# Patient Record
Sex: FEMALE | Race: WHITE | NOT HISPANIC OR LATINO | Employment: OTHER | URBAN - METROPOLITAN AREA
[De-identification: names, ages, dates, MRNs, and addresses within clinical notes are randomized per-mention and may not be internally consistent; named-entity substitution may affect disease eponyms.]

---

## 2020-01-04 ENCOUNTER — HOSPITAL ENCOUNTER (INPATIENT)
Facility: HOSPITAL | Age: 85
LOS: 5 days | Discharge: HOME HEALTH | DRG: 871 | End: 2020-01-09
Attending: EMERGENCY MEDICINE | Admitting: INTERNAL MEDICINE
Payer: MEDICARE

## 2020-01-04 DIAGNOSIS — J21.9 BRONCHIOLITIS: ICD-10-CM

## 2020-01-04 DIAGNOSIS — I48.20 CHRONIC ATRIAL FIBRILLATION: ICD-10-CM

## 2020-01-04 DIAGNOSIS — I48.91 NEW ONSET A-FIB: ICD-10-CM

## 2020-01-04 DIAGNOSIS — R05.9 COUGH: ICD-10-CM

## 2020-01-04 DIAGNOSIS — J21.9 ACUTE BRONCHIOLITIS DUE TO UNSPECIFIED ORGANISM: ICD-10-CM

## 2020-01-04 DIAGNOSIS — R11.10 VOMITING: ICD-10-CM

## 2020-01-04 DIAGNOSIS — I48.91 ATRIAL FIBRILLATION: ICD-10-CM

## 2020-01-04 DIAGNOSIS — J96.01 ACUTE HYPOXEMIC RESPIRATORY FAILURE: Primary | ICD-10-CM

## 2020-01-04 DIAGNOSIS — Z79.01 CHRONIC ANTICOAGULATION: ICD-10-CM

## 2020-01-04 PROBLEM — A41.9 SEPSIS: Status: ACTIVE | Noted: 2020-01-04

## 2020-01-04 PROBLEM — Z86.711 HISTORY OF PULMONARY EMBOLISM: Status: ACTIVE | Noted: 2020-01-04

## 2020-01-04 PROBLEM — R11.2 NAUSEA AND VOMITING: Status: ACTIVE | Noted: 2020-01-04

## 2020-01-04 PROBLEM — R10.9 ABDOMINAL PAIN: Status: ACTIVE | Noted: 2020-01-04

## 2020-01-04 PROBLEM — I10 HTN (HYPERTENSION): Status: ACTIVE | Noted: 2020-01-04

## 2020-01-04 PROBLEM — E03.9 HYPOTHYROIDISM: Status: ACTIVE | Noted: 2020-01-04

## 2020-01-04 LAB
ALBUMIN SERPL BCP-MCNC: 3.6 G/DL (ref 3.5–5.2)
ALLENS TEST: ABNORMAL
ALP SERPL-CCNC: 106 U/L (ref 55–135)
ALT SERPL W/O P-5'-P-CCNC: 12 U/L (ref 10–44)
ANION GAP SERPL CALC-SCNC: 11 MMOL/L (ref 8–16)
APTT BLDCRRT: 27.1 SEC (ref 21–32)
AST SERPL-CCNC: 24 U/L (ref 10–40)
BACTERIA #/AREA URNS HPF: NORMAL /HPF
BASOPHILS # BLD AUTO: 0.03 K/UL (ref 0–0.2)
BASOPHILS NFR BLD: 0.2 % (ref 0–1.9)
BILIRUB SERPL-MCNC: 0.7 MG/DL (ref 0.1–1)
BILIRUB UR QL STRIP: NEGATIVE
BNP SERPL-MCNC: 182 PG/ML (ref 0–99)
BUN SERPL-MCNC: 15 MG/DL (ref 8–23)
CALCIUM SERPL-MCNC: 9.6 MG/DL (ref 8.7–10.5)
CHLORIDE SERPL-SCNC: 100 MMOL/L (ref 95–110)
CLARITY UR: CLEAR
CO2 SERPL-SCNC: 26 MMOL/L (ref 23–29)
COLOR UR: YELLOW
CREAT SERPL-MCNC: 0.7 MG/DL (ref 0.5–1.4)
DELSYS: ABNORMAL
DIFFERENTIAL METHOD: ABNORMAL
EOSINOPHIL # BLD AUTO: 0 K/UL (ref 0–0.5)
EOSINOPHIL NFR BLD: 0.1 % (ref 0–8)
ERYTHROCYTE [DISTWIDTH] IN BLOOD BY AUTOMATED COUNT: 12.4 % (ref 11.5–14.5)
EST. GFR  (AFRICAN AMERICAN): >60 ML/MIN/1.73 M^2
EST. GFR  (NON AFRICAN AMERICAN): >60 ML/MIN/1.73 M^2
FIO2: 21
GLUCOSE SERPL-MCNC: 112 MG/DL (ref 70–110)
GLUCOSE UR QL STRIP: NEGATIVE
HCO3 UR-SCNC: 27.8 MMOL/L (ref 24–28)
HCT VFR BLD AUTO: 44.2 % (ref 37–48.5)
HGB BLD-MCNC: 14.2 G/DL (ref 12–16)
HGB UR QL STRIP: ABNORMAL
IMM GRANULOCYTES # BLD AUTO: 0.09 K/UL (ref 0–0.04)
IMM GRANULOCYTES NFR BLD AUTO: 0.7 % (ref 0–0.5)
INFLUENZA A, MOLECULAR: NEGATIVE
INFLUENZA B, MOLECULAR: NEGATIVE
INR PPP: 1 (ref 0.8–1.2)
KETONES UR QL STRIP: NEGATIVE
LACTATE SERPL-SCNC: 0.8 MMOL/L (ref 0.5–2.2)
LEUKOCYTE ESTERASE UR QL STRIP: NEGATIVE
LIPASE SERPL-CCNC: 17 U/L (ref 4–60)
LYMPHOCYTES # BLD AUTO: 1.9 K/UL (ref 1–4.8)
LYMPHOCYTES NFR BLD: 15.1 % (ref 18–48)
MCH RBC QN AUTO: 28.9 PG (ref 27–31)
MCHC RBC AUTO-ENTMCNC: 32.1 G/DL (ref 32–36)
MCV RBC AUTO: 90 FL (ref 82–98)
MICROSCOPIC COMMENT: NORMAL
MODE: ABNORMAL
MONOCYTES # BLD AUTO: 1.3 K/UL (ref 0.3–1)
MONOCYTES NFR BLD: 9.9 % (ref 4–15)
NEUTROPHILS # BLD AUTO: 9.5 K/UL (ref 1.8–7.7)
NEUTROPHILS NFR BLD: 74 % (ref 38–73)
NITRITE UR QL STRIP: NEGATIVE
NRBC BLD-RTO: 0 /100 WBC
PCO2 BLDA: 44.9 MMHG (ref 35–45)
PH SMN: 7.4 [PH] (ref 7.35–7.45)
PH UR STRIP: 6 [PH] (ref 5–8)
PLATELET # BLD AUTO: 246 K/UL (ref 150–350)
PMV BLD AUTO: 10 FL (ref 9.2–12.9)
PO2 BLDA: 59 MMHG (ref 80–100)
POC BE: 3 MMOL/L
POC SATURATED O2: 90 % (ref 95–100)
POTASSIUM SERPL-SCNC: 3.8 MMOL/L (ref 3.5–5.1)
PROCALCITONIN SERPL IA-MCNC: 0.03 NG/ML
PROT SERPL-MCNC: 7.2 G/DL (ref 6–8.4)
PROT UR QL STRIP: NEGATIVE
PROTHROMBIN TIME: 10.5 SEC (ref 9–12.5)
RBC # BLD AUTO: 4.92 M/UL (ref 4–5.4)
RBC #/AREA URNS HPF: 1 /HPF (ref 0–4)
SAMPLE: ABNORMAL
SITE: ABNORMAL
SODIUM SERPL-SCNC: 137 MMOL/L (ref 136–145)
SP GR UR STRIP: 1.02 (ref 1–1.03)
SPECIMEN SOURCE: NORMAL
SQUAMOUS #/AREA URNS HPF: 1 /HPF
TROPONIN I SERPL DL<=0.01 NG/ML-MCNC: 0.02 NG/ML (ref 0–0.03)
URN SPEC COLLECT METH UR: ABNORMAL
UROBILINOGEN UR STRIP-ACNC: NEGATIVE EU/DL
WBC # BLD AUTO: 12.83 K/UL (ref 3.9–12.7)
WBC #/AREA URNS HPF: 1 /HPF (ref 0–5)
YEAST URNS QL MICRO: NORMAL

## 2020-01-04 PROCEDURE — 81000 URINALYSIS NONAUTO W/SCOPE: CPT

## 2020-01-04 PROCEDURE — 85730 THROMBOPLASTIN TIME PARTIAL: CPT

## 2020-01-04 PROCEDURE — 25000003 PHARM REV CODE 250: Performed by: NURSE PRACTITIONER

## 2020-01-04 PROCEDURE — 83880 ASSAY OF NATRIURETIC PEPTIDE: CPT

## 2020-01-04 PROCEDURE — 85025 COMPLETE CBC W/AUTO DIFF WBC: CPT

## 2020-01-04 PROCEDURE — 96375 TX/PRO/DX INJ NEW DRUG ADDON: CPT

## 2020-01-04 PROCEDURE — 84484 ASSAY OF TROPONIN QUANT: CPT

## 2020-01-04 PROCEDURE — 93005 ELECTROCARDIOGRAM TRACING: CPT

## 2020-01-04 PROCEDURE — 83605 ASSAY OF LACTIC ACID: CPT

## 2020-01-04 PROCEDURE — 87205 SMEAR GRAM STAIN: CPT

## 2020-01-04 PROCEDURE — 99900035 HC TECH TIME PER 15 MIN (STAT)

## 2020-01-04 PROCEDURE — 93010 EKG 12-LEAD: ICD-10-PCS | Mod: ,,, | Performed by: INTERNAL MEDICINE

## 2020-01-04 PROCEDURE — 83690 ASSAY OF LIPASE: CPT

## 2020-01-04 PROCEDURE — 93010 ELECTROCARDIOGRAM REPORT: CPT | Mod: ,,, | Performed by: INTERNAL MEDICINE

## 2020-01-04 PROCEDURE — 25000242 PHARM REV CODE 250 ALT 637 W/ HCPCS: Performed by: NURSE PRACTITIONER

## 2020-01-04 PROCEDURE — 36600 WITHDRAWAL OF ARTERIAL BLOOD: CPT

## 2020-01-04 PROCEDURE — 87040 BLOOD CULTURE FOR BACTERIA: CPT | Mod: 59

## 2020-01-04 PROCEDURE — 99291 CRITICAL CARE FIRST HOUR: CPT | Mod: 25

## 2020-01-04 PROCEDURE — 96365 THER/PROPH/DIAG IV INF INIT: CPT

## 2020-01-04 PROCEDURE — 27000221 HC OXYGEN, UP TO 24 HOURS

## 2020-01-04 PROCEDURE — 63600175 PHARM REV CODE 636 W HCPCS: Performed by: EMERGENCY MEDICINE

## 2020-01-04 PROCEDURE — 36415 COLL VENOUS BLD VENIPUNCTURE: CPT

## 2020-01-04 PROCEDURE — 87502 INFLUENZA DNA AMP PROBE: CPT

## 2020-01-04 PROCEDURE — 85610 PROTHROMBIN TIME: CPT

## 2020-01-04 PROCEDURE — 94664 DEMO&/EVAL PT USE INHALER: CPT

## 2020-01-04 PROCEDURE — 63600175 PHARM REV CODE 636 W HCPCS: Performed by: NURSE PRACTITIONER

## 2020-01-04 PROCEDURE — 27000646 HC AEROBIKA DEVICE

## 2020-01-04 PROCEDURE — 87070 CULTURE OTHR SPECIMN AEROBIC: CPT

## 2020-01-04 PROCEDURE — 21400001 HC TELEMETRY ROOM

## 2020-01-04 PROCEDURE — 94640 AIRWAY INHALATION TREATMENT: CPT

## 2020-01-04 PROCEDURE — 82803 BLOOD GASES ANY COMBINATION: CPT

## 2020-01-04 PROCEDURE — 94761 N-INVAS EAR/PLS OXIMETRY MLT: CPT

## 2020-01-04 PROCEDURE — 80053 COMPREHEN METABOLIC PANEL: CPT

## 2020-01-04 PROCEDURE — 84145 PROCALCITONIN (PCT): CPT

## 2020-01-04 RX ORDER — LISINOPRIL 20 MG/1
20 TABLET ORAL DAILY
Status: DISCONTINUED | OUTPATIENT
Start: 2020-01-04 | End: 2020-01-09 | Stop reason: HOSPADM

## 2020-01-04 RX ORDER — IBUPROFEN 200 MG
16 TABLET ORAL
Status: DISCONTINUED | OUTPATIENT
Start: 2020-01-04 | End: 2020-01-09 | Stop reason: HOSPADM

## 2020-01-04 RX ORDER — ONDANSETRON 2 MG/ML
4 INJECTION INTRAMUSCULAR; INTRAVENOUS EVERY 8 HOURS PRN
Status: DISCONTINUED | OUTPATIENT
Start: 2020-01-04 | End: 2020-01-09 | Stop reason: HOSPADM

## 2020-01-04 RX ORDER — LISINOPRIL 20 MG/1
20 TABLET ORAL DAILY
COMMUNITY

## 2020-01-04 RX ORDER — GUAIFENESIN 600 MG/1
600 TABLET, EXTENDED RELEASE ORAL 2 TIMES DAILY
Status: DISCONTINUED | OUTPATIENT
Start: 2020-01-04 | End: 2020-01-09 | Stop reason: HOSPADM

## 2020-01-04 RX ORDER — GLUCAGON 1 MG
1 KIT INJECTION
Status: DISCONTINUED | OUTPATIENT
Start: 2020-01-04 | End: 2020-01-09 | Stop reason: HOSPADM

## 2020-01-04 RX ORDER — ACETAMINOPHEN 325 MG/1
650 TABLET ORAL EVERY 4 HOURS PRN
Status: DISCONTINUED | OUTPATIENT
Start: 2020-01-04 | End: 2020-01-09 | Stop reason: HOSPADM

## 2020-01-04 RX ORDER — SODIUM CHLORIDE 0.9 % (FLUSH) 0.9 %
10 SYRINGE (ML) INJECTION
Status: DISCONTINUED | OUTPATIENT
Start: 2020-01-04 | End: 2020-01-09 | Stop reason: HOSPADM

## 2020-01-04 RX ORDER — ONDANSETRON 8 MG/1
8 TABLET, ORALLY DISINTEGRATING ORAL EVERY 8 HOURS PRN
Status: DISCONTINUED | OUTPATIENT
Start: 2020-01-04 | End: 2020-01-09 | Stop reason: HOSPADM

## 2020-01-04 RX ORDER — IBUPROFEN 200 MG
24 TABLET ORAL
Status: DISCONTINUED | OUTPATIENT
Start: 2020-01-04 | End: 2020-01-09 | Stop reason: HOSPADM

## 2020-01-04 RX ORDER — PANTOPRAZOLE SODIUM 40 MG/1
40 TABLET, DELAYED RELEASE ORAL DAILY
Status: DISCONTINUED | OUTPATIENT
Start: 2020-01-04 | End: 2020-01-09 | Stop reason: HOSPADM

## 2020-01-04 RX ORDER — LEVOTHYROXINE SODIUM 50 UG/1
50 TABLET ORAL
Status: DISCONTINUED | OUTPATIENT
Start: 2020-01-05 | End: 2020-01-09 | Stop reason: HOSPADM

## 2020-01-04 RX ORDER — IPRATROPIUM BROMIDE AND ALBUTEROL SULFATE 2.5; .5 MG/3ML; MG/3ML
3 SOLUTION RESPIRATORY (INHALATION)
Status: DISCONTINUED | OUTPATIENT
Start: 2020-01-04 | End: 2020-01-09 | Stop reason: HOSPADM

## 2020-01-04 RX ORDER — ONDANSETRON 2 MG/ML
4 INJECTION INTRAMUSCULAR; INTRAVENOUS
Status: COMPLETED | OUTPATIENT
Start: 2020-01-04 | End: 2020-01-04

## 2020-01-04 RX ORDER — LEVOTHYROXINE SODIUM 50 UG/1
50 TABLET ORAL
COMMUNITY

## 2020-01-04 RX ORDER — POLYETHYLENE GLYCOL 3350 17 G/17G
17 POWDER, FOR SOLUTION ORAL DAILY
Status: DISCONTINUED | OUTPATIENT
Start: 2020-01-05 | End: 2020-01-09 | Stop reason: HOSPADM

## 2020-01-04 RX ADMIN — IPRATROPIUM BROMIDE AND ALBUTEROL SULFATE 3 ML: .5; 3 SOLUTION RESPIRATORY (INHALATION) at 03:01

## 2020-01-04 RX ADMIN — CEFTRIAXONE 1 G: 1 INJECTION, SOLUTION INTRAVENOUS at 11:01

## 2020-01-04 RX ADMIN — SODIUM CHLORIDE 1000 ML: 0.9 INJECTION, SOLUTION INTRAVENOUS at 08:01

## 2020-01-04 RX ADMIN — LISINOPRIL 20 MG: 20 TABLET ORAL at 06:01

## 2020-01-04 RX ADMIN — PANTOPRAZOLE SODIUM 40 MG: 40 TABLET, DELAYED RELEASE ORAL at 05:01

## 2020-01-04 RX ADMIN — AZITHROMYCIN MONOHYDRATE 500 MG: 500 INJECTION, POWDER, LYOPHILIZED, FOR SOLUTION INTRAVENOUS at 11:01

## 2020-01-04 RX ADMIN — GUAIFENESIN 600 MG: 600 TABLET, EXTENDED RELEASE ORAL at 09:01

## 2020-01-04 RX ADMIN — APIXABAN 5 MG: 2.5 TABLET, FILM COATED ORAL at 09:01

## 2020-01-04 RX ADMIN — METHYLPREDNISOLONE SODIUM SUCCINATE 80 MG: 40 INJECTION, POWDER, FOR SOLUTION INTRAMUSCULAR; INTRAVENOUS at 05:01

## 2020-01-04 RX ADMIN — ONDANSETRON 4 MG: 2 INJECTION INTRAMUSCULAR; INTRAVENOUS at 08:01

## 2020-01-04 NOTE — H&P
[facility] Hospital [facility]  History & Physical    Patient Name: Aurea Harper  MRN: [id number]  Admission Date: 1/4/2020  Attending Physician: Gt Deleon, *   Primary Care Provider: Primary Doctor No         Patient information was obtained from patient and ER records.     Subjective:     Principal Problem:Acute hypoxemic respiratory failure    Chief Complaint:   Chief Complaint   Patient presents with    Emesis     Endorses abdominal pain, productive cough, body aches, fever, congestion, SOB.        HPI: Aurea Harper is a 87 y.o. female patient with a PMHx of HTN, and pulmonary embolus 8 years ago on long term OAC with Eliquis, who presented to the Emergency Department today for evaluation of abdominal pain and vomiting, which onset gradually 2-3 weeks PTA.  Patient reports that she has been feeling sick for the past few weeks and received a steroid shot 2 days ago, but is still experiencing sxs.  She has also been on Levaquin.  Symptoms are episodic and moderate in severity.  No mitigating or exacerbating factors reported.  Associated sxs include SOB, chills, subjective fever, nasal and chest congestion, sore throat, productive cough, body aches, and fatigue.  Patient denies any CP, HA, syncope, diaphoresis, diarrhea, dysuria, flank pain, dizziness, light-headedness, numbness, seizures, and all other sxs at this time.  No further complaints or concerns at this time.  ER workup showed:  Hypoxia with an SaO2 of 85% on RA, and mild tachycardia, otherwise stable VS.  CBC showed WBCs elevated to 12.8k, otherwise unremarkable.  CMP unremarkable.  Troponin negative.  Lactic acid and Procalcitonin both normal.  UA negative.  ABG showed ph 7.00, Pco2 44.9, Po2 59, HCo3 27.8 on RA.  CXR negative for acute process.  CT head showed no CT evidence of an acute intracranial process. Age-appropriate atrophy with mild deep white matter microangiopathy.  Patchy ethmoid mucosal thickening.   Abdominal Xray showed nonobstructive bowel gas pattern.  CT chest showed multifocal infectious bronchiolitis.  No effusions.  BC were drawn and patient was given Rocephin and Azithromycin in the ER and Hospital Medicine called for admission.  Influenza pending.  She will be admitted to Tele.  Patient is a Full Code.  Her SDM is her  Jack who can be reached at [phone].    Past Medical History:   Diagnosis Date    Hypertension     Irregular heart rhythm     Mitral valve prolapse     Pulmonary embolus        Past Surgical History:   Procedure Laterality Date    BACK SURGERY      EYE SURGERY      HYSTERECTOMY      TOTAL KNEE ARTHROPLASTY         Review of patient's allergies indicates:   Allergen Reactions    Pcn [penicillins]     Sulfa (sulfonamide antibiotics)        No current facility-administered medications on file prior to encounter.      Current Outpatient Medications on File Prior to Encounter   Medication Sig    alprazolam (XANAX) 0.5 MG tablet Take 0.5 mg by mouth nightly as needed.     apixaban (ELIQUIS) 5 mg Tab Take by mouth 2 (two) times daily.     ergocalciferol (ERGOCALCIFEROL) 50,000 unit Cap Take 50,000 Units by mouth every 7 days.    hydrochlorothiazide (HYDRODIURIL) 25 MG tablet Take 25 mg by mouth once daily.    hydrocodone-acetaminophen 5-325mg (NORCO) 5-325 mg per tablet Take 1 tablet by mouth every 6 (six) hours as needed for Pain.    levothyroxine (SYNTHROID) 50 MCG tablet Take 50 mcg by mouth before breakfast.     linaCLOtide (LINZESS) 290 mcg Cap capsule as needed.     lisinopril (PRINIVIL,ZESTRIL) 20 MG tablet Take 20 mg by mouth once daily.    omeprazole (PRILOSEC) 20 MG capsule Take 20 mg by mouth once daily.     Family History     None        Tobacco Use    Smoking status: Never Smoker   Substance and Sexual Activity    Alcohol use: No    Drug use: No    Sexual activity: Not Currently     Review of Systems   Constitutional: Positive for activity change,  chills, fatigue and fever. Negative for diaphoresis.   HENT: Positive for congestion, postnasal drip and sore throat. Negative for facial swelling, hearing loss, mouth sores, sneezing, tinnitus and trouble swallowing.    Eyes: Negative for photophobia, pain, discharge, redness and visual disturbance.   Respiratory: Positive for cough, shortness of breath and wheezing. Negative for apnea, choking, chest tightness and stridor.         Productive cough with gray sputum.   Cardiovascular: Negative for chest pain, palpitations and leg swelling.   Gastrointestinal: Positive for abdominal distention, abdominal pain and vomiting. Negative for anal bleeding, blood in stool, constipation, diarrhea, nausea and rectal pain.   Endocrine: Negative for cold intolerance, heat intolerance, polydipsia, polyphagia and polyuria.   Genitourinary: Negative for difficulty urinating, dysuria, flank pain, frequency, hematuria, pelvic pain, urgency, vaginal bleeding, vaginal discharge and vaginal pain.   Musculoskeletal: Positive for arthralgias and myalgias. Negative for back pain, gait problem and neck stiffness.   Skin: Negative for pallor, rash and wound.   Allergic/Immunologic: Negative for food allergies.   Neurological: Negative for dizziness, tremors, seizures, syncope, speech difficulty, weakness and headaches.   Hematological: Negative for adenopathy. Does not bruise/bleed easily.   Psychiatric/Behavioral: Negative for behavioral problems and confusion. The patient is not nervous/anxious.    All other systems reviewed and are negative.    Objective:     Vital Signs (Most Recent):  Temp: 98.4 °F (36.9 °C) (01/04/20 1002)  Pulse: 78 (01/04/20 1211)  Resp: 18 (01/04/20 1144)  BP: (!) 140/76 (01/04/20 1211)  SpO2: 99 % (01/04/20 1211) Vital Signs (24h Range):  Temp:  [98.4 °F (36.9 °C)-98.6 °F (37 °C)] 98.4 °F (36.9 °C)  Pulse:  [] 78  Resp:  [18-20] 18  SpO2:  [85 %-99 %] 99 %  BP: (140-160)/(73-83) 140/76     Weight: 101.4 kg  (223 lb 8.7 oz)  Body mass index is 35.01 kg/m².    Physical Exam   Constitutional: She is oriented to person, place, and time. She appears well-developed and well-nourished.   Morbidly obese CF resting comfortably in bed in NAD.   HENT:   Head: Normocephalic and atraumatic.   Mouth/Throat: Oropharynx is clear and moist.   Eyes: Pupils are equal, round, and reactive to light. Conjunctivae and EOM are normal.   Neck: Normal range of motion. Neck supple.   Cardiovascular: Normal rate, regular rhythm, normal heart sounds and intact distal pulses. Exam reveals no gallop and no friction rub.   No murmur heard.  Pulmonary/Chest: Accessory muscle usage present. No respiratory distress. She has wheezes. She has no rales. She exhibits no tenderness.   Conversational dyspnea; Coarse BBS with scattered rhonchi and wheezing throughout; comfortable on 2L NC.   Abdominal: Soft. Bowel sounds are normal. She exhibits no distension and no mass. There is no tenderness.   Musculoskeletal: Normal range of motion. She exhibits no edema or tenderness.   Neurological: She is alert and oriented to person, place, and time.   Skin: Skin is warm and dry. No rash noted. No erythema.   Psychiatric: She has a normal mood and affect. Her behavior is normal. Judgment and thought content normal.   Nursing note and vitals reviewed.        CRANIAL NERVES     CN III, IV, VI   Pupils are equal, round, and reactive to light.  Extraocular motions are normal.        Significant Labs:   ABGs:   Recent Labs   Lab 01/04/20  0952   PH 7.400   PCO2 44.9   HCO3 27.8   POCSATURATED 90*   BE 3     CBC:   Recent Labs   Lab 01/04/20  0818   WBC 12.83*   HGB 14.2   HCT 44.2        CMP:   Recent Labs   Lab 01/04/20  0818      K 3.8      CO2 26   *   BUN 15   CREATININE 0.7   CALCIUM 9.6   PROT 7.2   ALBUMIN 3.6   BILITOT 0.7   ALKPHOS 106   AST 24   ALT 12   ANIONGAP 11   EGFRNONAA >60     Cardiac Markers:   Recent Labs   Lab 01/04/20  0818    *     Lactic Acid:   Recent Labs   Lab 01/04/20  1116   LACTATE 0.8     Urine Studies:   Recent Labs   Lab 01/04/20  0927   COLORU Yellow   APPEARANCEUA Clear   PHUR 6.0   SPECGRAV 1.025   PROTEINUA Negative   GLUCUA Negative   KETONESU Negative   BILIRUBINUA Negative   OCCULTUA 1+*   NITRITE Negative   UROBILINOGEN Negative   LEUKOCYTESUR Negative   RBCUA 1   WBCUA 1   BACTERIA None   SQUAMEPITHEL 1       Significant Imaging: I have reviewed all pertinent imaging results/findings within the past 24 hours.    Assessment/Plan:     * Acute hypoxemic respiratory failure  - Admitted to Tele  - Likely secondary to infectious bronchiolitis  - Influenza pending  - Continue supplemental O2  - Continue IV ABX  - Scheduled duonebs  - Will need home O2 eval prior to DC      Bronchiolitis, acute  - BC drawn and are pending  - Sputum cx pending  - IV ABX  - Solumedrol q 6 hours  - Supplemental O2  - Acapella  - Scheduled Duonebs  - May need pulmonary consult pending clinical course  - Monitor      Sepsis  - Likely secondary to infectious bronchiolitis  - Lactic acid normal  - Tachycardic with mild leukocytosis  - BC drawn and are pending  - Continue IV ABX  - Currently hemodynamically stable      Abdominal pain  - N/V has now resolved  - Xray showed non-obstructive bowel gas pattern  - Lipase pending  - p.r.n. Antiemetics  - p.r.n. Analgesia  - Cardiac diet as tolerated  - monitor      HTN (hypertension)  - BP under fair control  - Continue home meds  - monitor and adjust as needed      Hypothyroidism  - TSH pending  - Continue home Synthroid and adjust as needed      History of pulmonary embolism  - Onset approximately 8 years ago from DVT  - Compliant with home Eliquis, will continue  - Monitor      VTE Risk Mitigation (From admission, onward)         Ordered     apixaban tablet 5 mg  2 times daily      01/04/20 1455     IP VTE HIGH RISK PATIENT  Once      01/04/20 1154     Place sequential compression device  Until  discontinued      01/04/20 1154                   Cherelle Sierra DNP, ACNP-BC  Department of Hospital Medicine   [facility]

## 2020-01-04 NOTE — SUBJECTIVE & OBJECTIVE
Past Medical History:   Diagnosis Date    Hypertension     Irregular heart rhythm     Mitral valve prolapse     Pulmonary embolus        Past Surgical History:   Procedure Laterality Date    BACK SURGERY      EYE SURGERY      HYSTERECTOMY      TOTAL KNEE ARTHROPLASTY         Review of patient's allergies indicates:   Allergen Reactions    Pcn [penicillins]     Sulfa (sulfonamide antibiotics)        No current facility-administered medications on file prior to encounter.      Current Outpatient Medications on File Prior to Encounter   Medication Sig    alprazolam (XANAX) 0.5 MG tablet Take 0.5 mg by mouth nightly as needed.     apixaban (ELIQUIS) 5 mg Tab Take by mouth 2 (two) times daily.     ergocalciferol (ERGOCALCIFEROL) 50,000 unit Cap Take 50,000 Units by mouth every 7 days.    hydrochlorothiazide (HYDRODIURIL) 25 MG tablet Take 25 mg by mouth once daily.    hydrocodone-acetaminophen 5-325mg (NORCO) 5-325 mg per tablet Take 1 tablet by mouth every 6 (six) hours as needed for Pain.    levothyroxine (SYNTHROID) 50 MCG tablet Take 50 mcg by mouth before breakfast.     linaCLOtide (LINZESS) 290 mcg Cap capsule as needed.     lisinopril (PRINIVIL,ZESTRIL) 20 MG tablet Take 20 mg by mouth once daily.    omeprazole (PRILOSEC) 20 MG capsule Take 20 mg by mouth once daily.     Family History     None        Tobacco Use    Smoking status: Never Smoker   Substance and Sexual Activity    Alcohol use: No    Drug use: No    Sexual activity: Not Currently     Review of Systems   Constitutional: Positive for activity change, chills, fatigue and fever. Negative for diaphoresis.   HENT: Positive for congestion, postnasal drip and sore throat. Negative for facial swelling, hearing loss, mouth sores, sneezing, tinnitus and trouble swallowing.    Eyes: Negative for photophobia, pain, discharge, redness and visual disturbance.   Respiratory: Positive for cough, shortness of breath and wheezing. Negative for  apnea, choking, chest tightness and stridor.         Productive cough with gray sputum.   Cardiovascular: Negative for chest pain, palpitations and leg swelling.   Gastrointestinal: Positive for abdominal distention, abdominal pain and vomiting. Negative for anal bleeding, blood in stool, constipation, diarrhea, nausea and rectal pain.   Endocrine: Negative for cold intolerance, heat intolerance, polydipsia, polyphagia and polyuria.   Genitourinary: Negative for difficulty urinating, dysuria, flank pain, frequency, hematuria, pelvic pain, urgency, vaginal bleeding, vaginal discharge and vaginal pain.   Musculoskeletal: Positive for arthralgias and myalgias. Negative for back pain, gait problem and neck stiffness.   Skin: Negative for pallor, rash and wound.   Allergic/Immunologic: Negative for food allergies.   Neurological: Negative for dizziness, tremors, seizures, syncope, speech difficulty, weakness and headaches.   Hematological: Negative for adenopathy. Does not bruise/bleed easily.   Psychiatric/Behavioral: Negative for behavioral problems and confusion. The patient is not nervous/anxious.    All other systems reviewed and are negative.    Objective:     Vital Signs (Most Recent):  Temp: 98.4 °F (36.9 °C) (01/04/20 1002)  Pulse: 78 (01/04/20 1211)  Resp: 18 (01/04/20 1144)  BP: (!) 140/76 (01/04/20 1211)  SpO2: 99 % (01/04/20 1211) Vital Signs (24h Range):  Temp:  [98.4 °F (36.9 °C)-98.6 °F (37 °C)] 98.4 °F (36.9 °C)  Pulse:  [] 78  Resp:  [18-20] 18  SpO2:  [85 %-99 %] 99 %  BP: (140-160)/(73-83) 140/76     Weight: 101.4 kg (223 lb 8.7 oz)  Body mass index is 35.01 kg/m².    Physical Exam   Constitutional: She is oriented to person, place, and time. She appears well-developed and well-nourished.   Morbidly obese CF resting comfortably in bed in NAD.   HENT:   Head: Normocephalic and atraumatic.   Mouth/Throat: Oropharynx is clear and moist.   Eyes: Pupils are equal, round, and reactive to light.  Conjunctivae and EOM are normal.   Neck: Normal range of motion. Neck supple.   Cardiovascular: Normal rate, regular rhythm, normal heart sounds and intact distal pulses. Exam reveals no gallop and no friction rub.   No murmur heard.  Pulmonary/Chest: Accessory muscle usage present. No respiratory distress. She has wheezes. She has no rales. She exhibits no tenderness.   Conversational dyspnea; Coarse BBS with scattered rhonchi and wheezing throughout; comfortable on 2L NC.   Abdominal: Soft. Bowel sounds are normal. She exhibits no distension and no mass. There is no tenderness.   Musculoskeletal: Normal range of motion. She exhibits no edema or tenderness.   Neurological: She is alert and oriented to person, place, and time.   Skin: Skin is warm and dry. No rash noted. No erythema.   Psychiatric: She has a normal mood and affect. Her behavior is normal. Judgment and thought content normal.   Nursing note and vitals reviewed.        CRANIAL NERVES     CN III, IV, VI   Pupils are equal, round, and reactive to light.  Extraocular motions are normal.        Significant Labs:   ABGs:   Recent Labs   Lab 01/04/20  0952   PH 7.400   PCO2 44.9   HCO3 27.8   POCSATURATED 90*   BE 3     CBC:   Recent Labs   Lab 01/04/20  0818   WBC 12.83*   HGB 14.2   HCT 44.2        CMP:   Recent Labs   Lab 01/04/20  0818      K 3.8      CO2 26   *   BUN 15   CREATININE 0.7   CALCIUM 9.6   PROT 7.2   ALBUMIN 3.6   BILITOT 0.7   ALKPHOS 106   AST 24   ALT 12   ANIONGAP 11   EGFRNONAA >60     Cardiac Markers:   Recent Labs   Lab 01/04/20  0818   *     Lactic Acid:   Recent Labs   Lab 01/04/20  1116   LACTATE 0.8     Urine Studies:   Recent Labs   Lab 01/04/20  0927   COLORU Yellow   APPEARANCEUA Clear   PHUR 6.0   SPECGRAV 1.025   PROTEINUA Negative   GLUCUA Negative   KETONESU Negative   BILIRUBINUA Negative   OCCULTUA 1+*   NITRITE Negative   UROBILINOGEN Negative   LEUKOCYTESUR Negative   RBCUA 1   WBCUA 1    BACTERIA None   SQUAMEPITHEL 1       Significant Imaging: I have reviewed all pertinent imaging results/findings within the past 24 hours.

## 2020-01-04 NOTE — ASSESSMENT & PLAN NOTE
- BC drawn and are pending  - Sputum cx pending  - IV ABX  - Solumedrol q 6 hours  - Supplemental O2  - Acapella  - Scheduled Duonebs  - May need pulmonary consult pending clinical course  - Monitor

## 2020-01-04 NOTE — HPI
Aurea Harper is a 87 y.o. female patient with a PMHx of HTN, and pulmonary embolus 8 years ago on long term OAC with Eliquis, who presented to the Emergency Department today for evaluation of abdominal pain and vomiting, which onset gradually 2-3 weeks PTA.  Patient reports that she has been feeling sick for the past few weeks and received a steroid shot 2 days ago, but is still experiencing sxs.  She has also been on Levaquin.  Symptoms are episodic and moderate in severity.  No mitigating or exacerbating factors reported.  Associated sxs include SOB, chills, subjective fever, nasal and chest congestion, sore throat, productive cough, body aches, and fatigue.  Patient denies any CP, HA, syncope, diaphoresis, diarrhea, dysuria, flank pain, dizziness, light-headedness, numbness, seizures, and all other sxs at this time.  No further complaints or concerns at this time.  ER workup showed:  Hypoxia with an SaO2 of 85% on RA, and mild tachycardia, otherwise stable VS.  CBC showed WBCs elevated to 12.8k, otherwise unremarkable.  CMP unremarkable.  Troponin negative.  Lactic acid and Procalcitonin both normal.  UA negative.  ABG showed ph 7.00, Pco2 44.9, Po2 59, HCo3 27.8 on RA.  CXR negative for acute process.  CT head showed no CT evidence of an acute intracranial process. Age-appropriate atrophy with mild deep white matter microangiopathy.  Patchy ethmoid mucosal thickening.  Abdominal Xray showed nonobstructive bowel gas pattern.  CT chest showed multifocal infectious bronchiolitis.  No effusions.  BC were drawn and patient was given Rocephin and Azithromycin in the ER and Hospital Medicine called for admission.  Influenza pending.  She will be admitted to Tele.  Patient is a Full Code.  Her SDM is her  Enrike who can be reached at [phone].

## 2020-01-04 NOTE — ASSESSMENT & PLAN NOTE
- Onset approximately 8 years ago from DVT  - Compliant with home Eliquis, will continue  - Monitor

## 2020-01-04 NOTE — ASSESSMENT & PLAN NOTE
- N/V has now resolved  - Xray showed non-obstructive bowel gas pattern  - Lipase pending  - p.r.n. Antiemetics  - p.r.n. Analgesia  - Cardiac diet as tolerated  - monitor

## 2020-01-04 NOTE — ED PROVIDER NOTES
"SCRIBE #1 NOTE: I, Anthony Berumen, am scribing for, and in the presence of, Ira Krishna MD. I have scribed the entire note.       History     Chief Complaint   Patient presents with    Emesis     vomiting, abdominal pain, productive cough.  pt states she was "saying words that didn't make sense"     Review of patient's allergies indicates:   Allergen Reactions    Pcn [penicillins]     Sulfa (sulfonamide antibiotics)          History of Present Illness     HPI    1/4/2020, 8:04 AM  History obtained from the patient      History of Present Illness: Aurea Harper is a 87 y.o. female patient with a PMHx of HTN, pulmonary embolus, who presents to the Emergency Department for evaluation of emesis which onset gradually 2-3 weeks PTA. Pt reports that she has been feeling sick for the past few weeks and received a steroid shot 2 days ago, but is still experiencing sxs. She has also been on Levaquin. Symptoms are episodic and moderate in severity. No mitigating or exacerbating factors reported. Associated sxs include chills, subjective fever, nasal and chest congestion, sore throat, cough, generalized abdominal pain, 1 episode of incontinence. Patient denies any CP, SOB, HA, syncope, weakness, diaphoresis, diarrhea, dysuria, flank pain, dizziness, light-headedness, numbness, seizures, and all other sxs at this time. No further complaints or concerns at this time.         Arrival mode: Personal vehicle     PCP: Primary Doctor No        Past Medical History:  Past Medical History:   Diagnosis Date    Hypertension     Irregular heart rhythm     Mitral valve prolapse     Pulmonary embolus        Past Surgical History:  Past Surgical History:   Procedure Laterality Date    BACK SURGERY      EYE SURGERY      HYSTERECTOMY      TOTAL KNEE ARTHROPLASTY           Family History:  History reviewed. No pertinent family history.    Social History:  Social History     Tobacco Use    Smoking status: Never Smoker "   Substance and Sexual Activity    Alcohol use: No    Drug use: No    Sexual activity: Unknown        Review of Systems     Review of Systems   Constitutional: Positive for chills and fever (subjective). Negative for activity change, appetite change and diaphoresis.        (+) AMS   HENT: Positive for congestion (nasal and chest) and sore throat. Negative for drooling, ear pain, mouth sores, rhinorrhea, sinus pain and trouble swallowing.    Eyes: Negative for pain and discharge.   Respiratory: Positive for cough (productive). Negative for chest tightness, shortness of breath, wheezing and stridor.    Cardiovascular: Negative for chest pain, palpitations and leg swelling.   Gastrointestinal: Positive for abdominal pain (generalized), nausea and vomiting. Negative for abdominal distention, blood in stool, constipation and diarrhea.   Genitourinary: Negative for difficulty urinating, dysuria, flank pain, frequency, hematuria and urgency.        (+) incontinence (1 episode)    Musculoskeletal: Negative for arthralgias, back pain and myalgias.   Skin: Negative for pallor, rash and wound.   Neurological: Negative for dizziness, seizures, syncope, weakness, light-headedness, numbness and headaches.   All other systems reviewed and are negative.       Physical Exam     Initial Vitals [01/04/20 0759]   BP Pulse Resp Temp SpO2   (!) 152/76 105 18 98.6 °F (37 °C) (!) 93 %      MAP       --          Physical Exam  Nursing Notes and Vital Signs Reviewed.  Constitutional: Patient is in no acute distress. Pt is an elderly lady. Morbidly obese.   Head: Atraumatic. Normocephalic.  Eyes: PERRL. EOM intact. Conjunctivae are not pale. No scleral icterus.  ENT: Some nasal congestion noted. Mucous membranes are moist. Oropharynx is clear and symmetric.    Neck: Supple. Full ROM. No lymphadenopathy.  Cardiovascular: Regular rate. Regular rhythm. No murmurs, rubs, or gallops. Distal pulses are 2+ and symmetric.  Pulmonary/Chest: No  respiratory distress. Coarse breath sounds. No wheezing or rales.  Abdominal: Soft and non-distended.  There is no tenderness.  No rebound, guarding, or rigidity. Good bowel sounds.  Genitourinary: No CVA tenderness  Musculoskeletal: Moves all extremities. No obvious deformities. No edema. No calf tenderness.  Skin: Warm and dry.  Neurological:  Alert, awake, and appropriate.  Normal speech.  No acute focal neurological deficits are appreciated.  Psychiatric: Normal affect. Good eye contact. Appropriate in content.     ED Course   Critical Care  Date/Time: 1/4/2020 10:43 AM  Performed by: Ira Krishna MD  Authorized by: Ira Krishna MD   Direct patient critical care time: 20 minutes  Additional history critical care time: 10 minutes  Ordering / reviewing critical care time: 10 minutes  Documentation critical care time: 10 minutes  Consulting other physicians critical care time: 10 minutes  Total critical care time (exclusive of procedural time) : 60 minutes  Critical care time was exclusive of separately billable procedures and treating other patients.  Critical care was necessary to treat or prevent imminent or life-threatening deterioration of the following conditions: respiratory failure (hypoxia, bronchiectasis).  Critical care was time spent personally by me on the following activities: blood draw for specimens, development of treatment plan with patient or surrogate, discussions with consultants, interpretation of cardiac output measurements, evaluation of patient's response to treatment, examination of patient, obtaining history from patient or surrogate, ordering and performing treatments and interventions, ordering and review of laboratory studies, ordering and review of radiographic studies, pulse oximetry, re-evaluation of patient's condition and review of old charts.        ED Vital Signs:  Vitals:    01/04/20 0759 01/04/20 0818 01/04/20 0907 01/04/20 0909   BP: (!) 152/76   (!) 154/73    Pulse: 105 104 (!) 111 110   Resp: 18 20 18   Temp: 98.6 °F (37 °C)      TempSrc: Oral      SpO2: (!) 93%  (!) 88% (!) 91%   Weight: 100.7 kg (222 lb)       01/04/20 0911 01/04/20 0931 01/04/20 0940 01/04/20 1002   BP:    (!) 147/83   Pulse: 106 104 104 108   Resp: 18 18 18 20   Temp:       TempSrc:       SpO2: (!) 90% (!) 89% (!) 88% (!) 89%   Weight:        01/04/20 1016 01/04/20 1019 01/04/20 1026 01/04/20 1033   BP: (!) 160/76      Pulse: 109 104 103 100   Resp: 20 18 18 18   Temp:       TempSrc:       SpO2: (!) 89% (!) 85% (!) 85% 96%   Weight:           Abnormal Lab Results:  Labs Reviewed   CBC W/ AUTO DIFFERENTIAL - Abnormal; Notable for the following components:       Result Value    WBC 12.83 (*)     Immature Granulocytes 0.7 (*)     Gran # (ANC) 9.5 (*)     Immature Grans (Abs) 0.09 (*)     Mono # 1.3 (*)     Gran% 74.0 (*)     Lymph% 15.1 (*)     All other components within normal limits   COMPREHENSIVE METABOLIC PANEL - Abnormal; Notable for the following components:    Glucose 112 (*)     All other components within normal limits   B-TYPE NATRIURETIC PEPTIDE - Abnormal; Notable for the following components:     (*)     All other components within normal limits   URINALYSIS, REFLEX TO URINE CULTURE - Abnormal; Notable for the following components:    Occult Blood UA 1+ (*)     All other components within normal limits    Narrative:     Preferred Collection Type->Urine, Clean Catch   ISTAT PROCEDURE - Abnormal; Notable for the following components:    POC PO2 59 (*)     POC SATURATED O2 90 (*)     All other components within normal limits   CULTURE, RESPIRATORY   CULTURE, BLOOD   CULTURE, BLOOD   TROPONIN I   PROTIME-INR   APTT   URINALYSIS MICROSCOPIC    Narrative:     Preferred Collection Type->Urine, Clean Catch   PROCALCITONIN   LACTIC ACID, PLASMA   PROCALCITONIN        All Lab Results:  Results for orders placed or performed during the hospital encounter of 01/04/20   CBC auto  differential   Result Value Ref Range    WBC 12.83 (H) 3.90 - 12.70 K/uL    RBC 4.92 4.00 - 5.40 M/uL    Hemoglobin 14.2 12.0 - 16.0 g/dL    Hematocrit 44.2 37.0 - 48.5 %    Mean Corpuscular Volume 90 82 - 98 fL    Mean Corpuscular Hemoglobin 28.9 27.0 - 31.0 pg    Mean Corpuscular Hemoglobin Conc 32.1 32.0 - 36.0 g/dL    RDW 12.4 11.5 - 14.5 %    Platelets 246 150 - 350 K/uL    MPV 10.0 9.2 - 12.9 fL    Immature Granulocytes 0.7 (H) 0.0 - 0.5 %    Gran # (ANC) 9.5 (H) 1.8 - 7.7 K/uL    Immature Grans (Abs) 0.09 (H) 0.00 - 0.04 K/uL    Lymph # 1.9 1.0 - 4.8 K/uL    Mono # 1.3 (H) 0.3 - 1.0 K/uL    Eos # 0.0 0.0 - 0.5 K/uL    Baso # 0.03 0.00 - 0.20 K/uL    nRBC 0 0 /100 WBC    Gran% 74.0 (H) 38.0 - 73.0 %    Lymph% 15.1 (L) 18.0 - 48.0 %    Mono% 9.9 4.0 - 15.0 %    Eosinophil% 0.1 0.0 - 8.0 %    Basophil% 0.2 0.0 - 1.9 %    Differential Method Automated    Comprehensive metabolic panel   Result Value Ref Range    Sodium 137 136 - 145 mmol/L    Potassium 3.8 3.5 - 5.1 mmol/L    Chloride 100 95 - 110 mmol/L    CO2 26 23 - 29 mmol/L    Glucose 112 (H) 70 - 110 mg/dL    BUN, Bld 15 8 - 23 mg/dL    Creatinine 0.7 0.5 - 1.4 mg/dL    Calcium 9.6 8.7 - 10.5 mg/dL    Total Protein 7.2 6.0 - 8.4 g/dL    Albumin 3.6 3.5 - 5.2 g/dL    Total Bilirubin 0.7 0.1 - 1.0 mg/dL    Alkaline Phosphatase 106 55 - 135 U/L    AST 24 10 - 40 U/L    ALT 12 10 - 44 U/L    Anion Gap 11 8 - 16 mmol/L    eGFR if African American >60 >60 mL/min/1.73 m^2    eGFR if non African American >60 >60 mL/min/1.73 m^2   Troponin I #1   Result Value Ref Range    Troponin I 0.023 0.000 - 0.026 ng/mL   B-Type natriuretic peptide (BNP)   Result Value Ref Range     (H) 0 - 99 pg/mL   Protime-INR   Result Value Ref Range    Prothrombin Time 10.5 9.0 - 12.5 sec    INR 1.0 0.8 - 1.2   APTT   Result Value Ref Range    aPTT 27.1 21.0 - 32.0 sec   Urinalysis, Reflex to Urine Culture Urine, Clean Catch   Result Value Ref Range    Specimen UA Urine, Catheterized      Color, UA Yellow Yellow, Straw, Zayda    Appearance, UA Clear Clear    pH, UA 6.0 5.0 - 8.0    Specific Gravity, UA 1.025 1.005 - 1.030    Protein, UA Negative Negative    Glucose, UA Negative Negative    Ketones, UA Negative Negative    Bilirubin (UA) Negative Negative    Occult Blood UA 1+ (A) Negative    Nitrite, UA Negative Negative    Urobilinogen, UA Negative <2.0 EU/dL    Leukocytes, UA Negative Negative   Urinalysis Microscopic   Result Value Ref Range    RBC, UA 1 0 - 4 /hpf    WBC, UA 1 0 - 5 /hpf    Bacteria None None-Occ /hpf    Yeast, UA None None    Squam Epithel, UA 1 /hpf    Microscopic Comment SEE COMMENT    ISTAT PROCEDURE   Result Value Ref Range    POC PH 7.400 7.35 - 7.45    POC PCO2 44.9 35 - 45 mmHg    POC PO2 59 (LL) 80 - 100 mmHg    POC HCO3 27.8 24 - 28 mmol/L    POC BE 3 -2 to 2 mmol/L    POC SATURATED O2 90 (L) 95 - 100 %    Sample ARTERIAL     Site LF     Allens Test Pass     DelSys Room Air     Mode SPONT     FiO2 21          Imaging Results:  Imaging Results          CT Chest Without Contrast (Final result)  Result time 01/04/20 10:23:35    Final result by Dereck Granados MD (01/04/20 10:23:35)                 Impression:      Multifocal infectious bronchiolitis.  No effusions.    All CT scans at this facility are performed  using dose modulation techniques as appropriate to performed exam including the following:  automated exposure control; adjustment of mA and/or kV according to the patients size (this includes techniques or standardized protocols for targeted exams where dose is matched to indication/reason for exam: i.e. extremities or head);  iterative reconstruction technique.      Electronically signed by: Dereck Granados MD  Date:    01/04/2020  Time:    10:23             Narrative:    EXAMINATION:  CT CHEST WITHOUT CONTRAST    CLINICAL HISTORY:  Cough, persistent, xray nondiagnostic;    TECHNIQUE:  Axial CT images performed through the chest without intravenous  contrast.    COMPARISON:  Chest radiograph today    FINDINGS:  Borderline heart size.  Prominent pericardial pad.  Aortic atherosclerosis.  Coronary artery calcifications.  No thoracic adenopathy.    Mild diffuse bronchial wall thickening, greatest in the lower lobes.  Tree-in-bud micro nodular opacities in the lingula and bilateral lower lobes characteristic of an infectious bronchiolitis.  No alveolar consolidations or effusions.  No pleural effusions.  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    The upper abdominal visualized organs are within normal limits.    The bones are intact.                               X-Ray Abdomen Flat And Erect (Final result)  Result time 01/04/20 08:46:20    Final result by Adonay Hines MD (01/04/20 08:46:20)                 Impression:      Nonobstructive bowel gas pattern.      Electronically signed by: Adonay Hines MD  Date:    01/04/2020  Time:    08:46             Narrative:    EXAMINATION:  XR ABDOMEN FLAT AND ERECT    CLINICAL HISTORY:  Vomiting, unspecified    FINDINGS:  Lung bases are clear.  Normal nonobstructive bowel gas pattern with average stool.  No air-fluid level on upright imaging.  No free air.  Pelvic phleboliths.  Splenic and iliac atherosclerosis.  Degenerative spine.                               X-Ray Chest AP Portable (Final result)  Result time 01/04/20 08:45:28    Final result by Adonay Hines MD (01/04/20 08:45:28)                 Impression:      No acute process.      Electronically signed by: Adonay Hines MD  Date:    01/04/2020  Time:    08:45             Narrative:    EXAMINATION:  XR CHEST AP PORTABLE    CLINICAL HISTORY:  Chest Pain;    FINDINGS:  No prior study.  Normal size heart.  Aortic arch calcification.  No congestion.  Lungs are clear.  Postoperative right humerus.                               CT Head Without Contrast (Final result)  Result time 01/04/20 08:44:00    Final result by Adonay  RUSS Hines MD (01/04/20 08:44:00)                 Impression:      No CT evidence of an acute intracranial process.  Age-appropriate atrophy with mild deep white matter microangiopathy.  Patchy ethmoid mucosal thickening.    All CT scans at this facility are performed  using dose modulation techniques as appropriate to performed exam including the following:  automated exposure control; adjustment of mA and/or kV according to the patients size (this includes techniques or standardized protocols for targeted exams where dose is matched to indication/reason for exam: i.e. extremities or head);  iterative reconstruction technique.      Electronically signed by: Adonay Hines MD  Date:    01/04/2020  Time:    08:44             Narrative:    EXAMINATION:  CT HEAD WITHOUT CONTRAST    CLINICAL HISTORY:  Headache, acute, norm neuro exam;    TECHNIQUE:  Routine noncontrast head CT.    COMPARISON:  None    FINDINGS:  There is no acute intracranial hemorrhage or abnormal extra-axial fluid collection.  There is age-appropriate cerebral atrophy with ventricular-sulcal congruence.  Patchy periventricular and internal capsule deep white matter microangiopathy low attenuation.  There is no additional abnormal increased or decreased density within the brain parenchyma.  Gray-white differentiation preserved.  No intracranial mass or mass effect.  The calvarium is intact.  There is mucosal thickening bilateral ethmoid air cells.  Otherwise, the visualized paranasal sinuses and mastoids are well aerated.                                 The EKG was ordered, reviewed, and independently interpreted by the ED provider.  Interpretation time: 0821  Rate: 98 BPM  Rhythm: atrial fibrillation  Interpretation: L posterior fascicular block. Septal infarct, age undetermined. Possible inferior infarct, age undetermined. No STEMI.             The Emergency Provider reviewed the vital signs and test results, which are outlined above.     ED Discussion      10:00 AM: Re-evaluated pt. Pt is noted to have an O2 level of 87-90 on room air, and is having no trouble breathing at this time. Pt is resting comfortably and is in no acute distress and has no complaints presently.  Awaiting urinalysis and chest CT.     11:09 AM: Discussed case with Dr. Chanel ([facility]). Dr. Chanel agrees with current care and management of pt and accepts admission.   Admitting Service: Hospital Medicine  Admitting Physician: Dr. Chanel  Admit to: Obs/Tele    11:09 AM: Re-evaluated pt. I have discussed test results, shared treatment plan, and the need for admission with patient and family at bedside. Pt and family express understanding at this time and agree with all information. All questions answered. Pt and family have no further questions or concerns at this time. Pt is ready for admit.         Medical Decision Making:   Clinical Tests:   Lab Tests: Ordered and Reviewed  Radiological Study: Ordered and Reviewed  Medical Tests: Ordered and Reviewed           ED Medication(s):  Medications   cefTRIAXone (ROCEPHIN) 1 g in dextrose 5 % 50 mL IVPB (1 g Intravenous New Bag 1/4/20 1104)   azithromycin 500 mg in dextrose 5 % 250 mL IVPB (ready to mix system) (has no administration in time range)   ondansetron injection 4 mg (4 mg Intravenous Given 1/4/20 9740)   sodium chloride 0.9% bolus 1,000 mL (1,000 mLs Intravenous New Bag 1/4/20 1669)       New Prescriptions    No medications on file               Scribe Attestation:   Scribe #1: I performed the above scribed service and the documentation accurately describes the services I performed. I attest to the accuracy of the note.     Attending:   Physician Attestation Statement for Scribe #1: I, Ira Krishna MD, personally performed the services described in this documentation, as scribed by Anthony Berumen, in my presence, and it is both accurate and complete.           Clinical Impression       ICD-10-CM ICD-9-CM   1. Acute  hypoxemic respiratory failure J96.01 518.81   2. Cough R05 786.2   3. Vomiting R11.10 787.03   4. Bronchiolitis J21.9 466.19   5. Chronic atrial fibrillation I48.20 427.31   6. Chronic anticoagulation Z79.01 V58.61       Disposition:   Disposition: Placed in Observation  Condition: Radha Krishna MD  01/04/20 1124

## 2020-01-04 NOTE — ED NOTES
"Pt reports productive cough and "feeling like crud" for a month. Pt reports n/v.    Patient moved to ED room 13, patient assisted onto stretcher and changed into a gown. Patient placed on cardiac monitor, continuous pulse oximetry and automatic blood pressure cuff. Bed placed in low locked position, side rails up x 2, call light is within reach of patient or family, orientation to room and explanation of wait provided to family and patient, alarms set and turned on for monitor and pulse ox, awaiting MD evaluation and orders, will continue to monitor.    Patient identifies self as Aurea Harper      LOC: The patient is awake, alert and aware of environment with an appropriate affect, the patient is oriented x 3 and speaking appropriately.  APPEARANCE: Patient resting comfortably and in no acute distress, patient is clean and well groomed, patient's clothing is properly fastened.  SKIN: The skin is warm and dry, color consistent with ethnicity, patient has normal skin turgor and moist mucus membranes, skin intact, no breakdown EX: generalized bruising  MUSCULOSKELETAL: Patient moving all extremities well, no obvious swelling or deformities noted.  RESPIRATORY: Airway is open and patent, respirations are spontaneous, patient has a normal effort and rate, no accessory muscle use noted.  CARDIAC: Patient has a normal rate, no peripheral edema noted, capillary refill < 3 seconds.  ABDOMEN: Soft and non tender to palpation, no distention noted.  NEUROLOGIC: PERRL, eyes open spontaneously, behavior appropriate to situation, follows commands, facial expression symmetrical, bilateral hand grasp equal and even, purposeful motor response noted, normal sensation in all extremities when touched with a finger.        "

## 2020-01-04 NOTE — ASSESSMENT & PLAN NOTE
- Likely secondary to infectious bronchiolitis  - Lactic acid normal  - Tachycardic with mild leukocytosis  - BC drawn and are pending  - Continue IV ABX  - Currently hemodynamically stable

## 2020-01-04 NOTE — ASSESSMENT & PLAN NOTE
- Admitted to Tele  - Likely secondary to infectious bronchiolitis  - Influenza pending  - Continue supplemental O2  - Continue IV ABX  - Scheduled duonebs  - Will need home O2 eval prior to DC

## 2020-01-05 LAB
ANION GAP SERPL CALC-SCNC: 11 MMOL/L (ref 8–16)
BASOPHILS # BLD AUTO: 0.02 K/UL (ref 0–0.2)
BASOPHILS NFR BLD: 0.2 % (ref 0–1.9)
BUN SERPL-MCNC: 17 MG/DL (ref 8–23)
CALCIUM SERPL-MCNC: 9.1 MG/DL (ref 8.7–10.5)
CHLORIDE SERPL-SCNC: 100 MMOL/L (ref 95–110)
CO2 SERPL-SCNC: 26 MMOL/L (ref 23–29)
CREAT SERPL-MCNC: 0.7 MG/DL (ref 0.5–1.4)
DIFFERENTIAL METHOD: ABNORMAL
EOSINOPHIL # BLD AUTO: 0 K/UL (ref 0–0.5)
EOSINOPHIL NFR BLD: 0 % (ref 0–8)
ERYTHROCYTE [DISTWIDTH] IN BLOOD BY AUTOMATED COUNT: 12.4 % (ref 11.5–14.5)
EST. GFR  (AFRICAN AMERICAN): >60 ML/MIN/1.73 M^2
EST. GFR  (NON AFRICAN AMERICAN): >60 ML/MIN/1.73 M^2
GLUCOSE SERPL-MCNC: 180 MG/DL (ref 70–110)
HCT VFR BLD AUTO: 40.2 % (ref 37–48.5)
HGB BLD-MCNC: 12.8 G/DL (ref 12–16)
IMM GRANULOCYTES # BLD AUTO: 0.1 K/UL (ref 0–0.04)
IMM GRANULOCYTES NFR BLD AUTO: 1 % (ref 0–0.5)
LYMPHOCYTES # BLD AUTO: 0.9 K/UL (ref 1–4.8)
LYMPHOCYTES NFR BLD: 8.6 % (ref 18–48)
MCH RBC QN AUTO: 28.8 PG (ref 27–31)
MCHC RBC AUTO-ENTMCNC: 31.8 G/DL (ref 32–36)
MCV RBC AUTO: 91 FL (ref 82–98)
MONOCYTES # BLD AUTO: 0.1 K/UL (ref 0.3–1)
MONOCYTES NFR BLD: 0.8 % (ref 4–15)
NEUTROPHILS # BLD AUTO: 8.8 K/UL (ref 1.8–7.7)
NEUTROPHILS NFR BLD: 89.4 % (ref 38–73)
NRBC BLD-RTO: 0 /100 WBC
PLATELET # BLD AUTO: 208 K/UL (ref 150–350)
PMV BLD AUTO: 10.4 FL (ref 9.2–12.9)
POTASSIUM SERPL-SCNC: 4 MMOL/L (ref 3.5–5.1)
RBC # BLD AUTO: 4.44 M/UL (ref 4–5.4)
SODIUM SERPL-SCNC: 137 MMOL/L (ref 136–145)
TSH SERPL DL<=0.005 MIU/L-ACNC: 0.74 UIU/ML (ref 0.4–4)
WBC # BLD AUTO: 9.89 K/UL (ref 3.9–12.7)

## 2020-01-05 PROCEDURE — 94640 AIRWAY INHALATION TREATMENT: CPT

## 2020-01-05 PROCEDURE — 99900035 HC TECH TIME PER 15 MIN (STAT)

## 2020-01-05 PROCEDURE — 25000242 PHARM REV CODE 250 ALT 637 W/ HCPCS: Performed by: NURSE PRACTITIONER

## 2020-01-05 PROCEDURE — 21400001 HC TELEMETRY ROOM

## 2020-01-05 PROCEDURE — 93010 ELECTROCARDIOGRAM REPORT: CPT | Mod: ,,, | Performed by: INTERNAL MEDICINE

## 2020-01-05 PROCEDURE — 85025 COMPLETE CBC W/AUTO DIFF WBC: CPT

## 2020-01-05 PROCEDURE — 27000221 HC OXYGEN, UP TO 24 HOURS

## 2020-01-05 PROCEDURE — 93010 EKG 12-LEAD: ICD-10-PCS | Mod: ,,, | Performed by: INTERNAL MEDICINE

## 2020-01-05 PROCEDURE — 84443 ASSAY THYROID STIM HORMONE: CPT

## 2020-01-05 PROCEDURE — 80048 BASIC METABOLIC PNL TOTAL CA: CPT

## 2020-01-05 PROCEDURE — 25000003 PHARM REV CODE 250: Performed by: NURSE PRACTITIONER

## 2020-01-05 PROCEDURE — 27000646 HC AEROBIKA DEVICE

## 2020-01-05 PROCEDURE — 94664 DEMO&/EVAL PT USE INHALER: CPT

## 2020-01-05 PROCEDURE — 63600175 PHARM REV CODE 636 W HCPCS: Performed by: NURSE PRACTITIONER

## 2020-01-05 PROCEDURE — 25000003 PHARM REV CODE 250: Performed by: INTERNAL MEDICINE

## 2020-01-05 PROCEDURE — 36415 COLL VENOUS BLD VENIPUNCTURE: CPT

## 2020-01-05 PROCEDURE — 93005 ELECTROCARDIOGRAM TRACING: CPT

## 2020-01-05 PROCEDURE — 94761 N-INVAS EAR/PLS OXIMETRY MLT: CPT

## 2020-01-05 RX ORDER — TALC
6 POWDER (GRAM) TOPICAL NIGHTLY PRN
Status: DISCONTINUED | OUTPATIENT
Start: 2020-01-05 | End: 2020-01-09 | Stop reason: HOSPADM

## 2020-01-05 RX ORDER — HYDROCHLOROTHIAZIDE 25 MG/1
25 TABLET ORAL DAILY
Status: DISCONTINUED | OUTPATIENT
Start: 2020-01-05 | End: 2020-01-09 | Stop reason: HOSPADM

## 2020-01-05 RX ORDER — IPRATROPIUM BROMIDE AND ALBUTEROL SULFATE 2.5; .5 MG/3ML; MG/3ML
3 SOLUTION RESPIRATORY (INHALATION) EVERY 4 HOURS PRN
Status: DISCONTINUED | OUTPATIENT
Start: 2020-01-05 | End: 2020-01-09 | Stop reason: HOSPADM

## 2020-01-05 RX ORDER — LEVOFLOXACIN 500 MG/1
500 TABLET, FILM COATED ORAL DAILY
Status: ON HOLD | COMMUNITY
End: 2020-01-09 | Stop reason: HOSPADM

## 2020-01-05 RX ADMIN — HYDROCHLOROTHIAZIDE 25 MG: 25 TABLET ORAL at 11:01

## 2020-01-05 RX ADMIN — GUAIFENESIN 600 MG: 600 TABLET, EXTENDED RELEASE ORAL at 09:01

## 2020-01-05 RX ADMIN — IPRATROPIUM BROMIDE AND ALBUTEROL SULFATE 3 ML: .5; 3 SOLUTION RESPIRATORY (INHALATION) at 11:01

## 2020-01-05 RX ADMIN — LISINOPRIL 20 MG: 20 TABLET ORAL at 09:01

## 2020-01-05 RX ADMIN — IPRATROPIUM BROMIDE AND ALBUTEROL SULFATE 3 ML: .5; 3 SOLUTION RESPIRATORY (INHALATION) at 04:01

## 2020-01-05 RX ADMIN — LEVOTHYROXINE SODIUM 50 MCG: 50 TABLET ORAL at 05:01

## 2020-01-05 RX ADMIN — METHYLPREDNISOLONE SODIUM SUCCINATE 80 MG: 40 INJECTION, POWDER, FOR SOLUTION INTRAMUSCULAR; INTRAVENOUS at 12:01

## 2020-01-05 RX ADMIN — IPRATROPIUM BROMIDE AND ALBUTEROL SULFATE 3 ML: .5; 3 SOLUTION RESPIRATORY (INHALATION) at 12:01

## 2020-01-05 RX ADMIN — PANTOPRAZOLE SODIUM 40 MG: 40 TABLET, DELAYED RELEASE ORAL at 09:01

## 2020-01-05 RX ADMIN — METHYLPREDNISOLONE SODIUM SUCCINATE 80 MG: 40 INJECTION, POWDER, FOR SOLUTION INTRAMUSCULAR; INTRAVENOUS at 06:01

## 2020-01-05 RX ADMIN — APIXABAN 5 MG: 2.5 TABLET, FILM COATED ORAL at 09:01

## 2020-01-05 RX ADMIN — METHYLPREDNISOLONE SODIUM SUCCINATE 80 MG: 40 INJECTION, POWDER, FOR SOLUTION INTRAMUSCULAR; INTRAVENOUS at 05:01

## 2020-01-05 RX ADMIN — IPRATROPIUM BROMIDE AND ALBUTEROL SULFATE 3 ML: .5; 3 SOLUTION RESPIRATORY (INHALATION) at 07:01

## 2020-01-05 RX ADMIN — AZITHROMYCIN MONOHYDRATE 500 MG: 500 INJECTION, POWDER, LYOPHILIZED, FOR SOLUTION INTRAVENOUS at 12:01

## 2020-01-05 RX ADMIN — CEFTRIAXONE 1 G: 1 INJECTION, SOLUTION INTRAVENOUS at 10:01

## 2020-01-05 RX ADMIN — POLYETHYLENE GLYCOL (3350) 17 G: 17 POWDER, FOR SOLUTION ORAL at 09:01

## 2020-01-05 NOTE — PLAN OF CARE
Patient Is accepted with [facility]        01/05/20 1514   Post-Acute Status   Post-Acute Authorization Home Health/Hospice   Home Health/Hospice Status Set-up Complete   Patient choice form signed by patient/caregiver List with quality metrics by geographic area provided

## 2020-01-05 NOTE — PLAN OF CARE
Patient states that she is currently set up with [facility] and wants to resume services with them when discharged.        01/05/20 1159   Post-Acute Status   Post-Acute Authorization Home Health/Hospice   Home Health/Hospice Status Referrals Sent   Patient choice form signed by patient/caregiver List with quality metrics by geographic area provided

## 2020-01-05 NOTE — HOSPITAL COURSE
"On 1/4 patient was admitted to the telemetry unit secondary to Sepsis and Acute hypoxemic respiratory failure thought to be related to infectious bronchiolitis.  BC were drawn and patient was started on scheduled nebulizer treatments in addition to Solu-Medrol IVP every 6 hr, IV ABX, and supplemental oxygen.      As of 1/5 Patient reports improvement in shortness of breath but continues to have wheezing.  BC show NGTD.  Sputum Cx shows normal respiratory ramses.  Influenza negative.  Plan to continue IV antibiotics in addition to nebulizer treatments and IV steroids.  May need pulmonary consult pending clinical course.    As of 1/6 Patient converted to AFib last night, and remains in AFib this morning.  Patient is uncertain of any prior history of AFib.  She is on Eliquis 5 mg p.o. B.i.d. for history of PE, which will be continued.   ECHO pending.  Cardiology consult pending for additional recommendations.  Patient continues to wheeze, but reports improvement in symptoms.  Will taper steroids today.  Viral panel pending.  BC show NGTD.      As of 1/7 Patient reports improvement in SOB, but continues to have audible wheezing and c/o chest "congestion".  Exertional RA SaO2 88%.  Will plan to keep overnight for continued IV steroids and anticipate DC home on oral ABX in the AM if she remains stable overnight.  Order placed to resume HH upon DC for nursing visits and PT/OT.  Will need repeat home O2 eval prior to DC.    1/8/20 - Still on 2 liters O2. ECHO normal. Repeat O2 eval worse today (86% ambulating). Will add budesonide and Formoteral nebulizers. Will give IV Lasix 40 mg x 1 and reevaluate in am. Insurance won't pay for Home O2.     As of 1/9 patient looks much better today and is currently resting comfortably in bed in no acute distress.  Respirations are even and unlabored and patient is comfortable on room air.  Wheezing has resolved.  Home O2 eval completed today and patient did not qualify with a resting room " air SaO2 of 96% and exertional of 94%.  VS remain stable, and patient has remained afebrile.  Mild leukocytosis is attributed to steroids.  BC show NGTD.  Sputum culture shows normal respiratory ramses.  Respiratory viral panel still pending.  Case d/w Dr. Chanel, patient seen and examined and deemed medically stable to DC home with  today.  She was instructed to follow up with her PCP within 3 days for post hospital evaluation and monitoring, verbalized positive understanding.  Patient will DC home to complete a prednisone taper in addition to Mucinex, and Levaquin x5 days.  Mild hyperglycemia is expected to improve as steroid taper continues.  Patient will also be given an albuterol inhaler as needed for wheezing/shortness of breath.  Medications reconciled for DC home.

## 2020-01-05 NOTE — ASSESSMENT & PLAN NOTE
- N/V has now resolved  - Xray showed non-obstructive bowel gas pattern  - Lipase normal  - p.r.n. Antiemetics  - p.r.n. Analgesia  - Cardiac diet as tolerated  - monitor

## 2020-01-05 NOTE — ASSESSMENT & PLAN NOTE
- BC show NGTD  - Sputum cx shows normal respiratory ramses  - Continue IV ABX  - Solumedrol q 6 hours  - Supplemental O2  - Acapella  - Scheduled Duonebs  - May need pulmonary consult pending clinical course  - Monitor

## 2020-01-05 NOTE — ASSESSMENT & PLAN NOTE
- Admitted to Tele  - Likely secondary to infectious bronchiolitis  - Influenza negative  - Continue supplemental O2  - Continue IV ABX and Solumedrol  - Scheduled duonebs  - Will need home O2 eval prior to DC

## 2020-01-05 NOTE — ASSESSMENT & PLAN NOTE
- Likely secondary to infectious bronchiolitis  - Lactic acid normal  - Tachycardia and leukocytosis have now resolved  - BC show NGTD  - Continue IV ABX  - Currently hemodynamically stable

## 2020-01-05 NOTE — PROGRESS NOTES
[facility] Hospital Medicine  Progress Note    Patient Name: Aurea Harper  MRN: [id number]  Patient Class: IP- Inpatient   Admission Date: 1/4/2020  Length of Stay: 1 days  Attending Physician: Gt Deleon, *  Primary Care Provider: Primary Doctor No        Subjective:     Principal Problem:Acute hypoxemic respiratory failure        HPI:  Aurea Harper is a 87 y.o. female patient with a PMHx of HTN, and pulmonary embolus 8 years ago on long term OAC with Eliquis, who presented to the Emergency Department today for evaluation of abdominal pain and vomiting, which onset gradually 2-3 weeks PTA.  Patient reports that she has been feeling sick for the past few weeks and received a steroid shot 2 days ago, but is still experiencing sxs.  She has also been on Levaquin.  Symptoms are episodic and moderate in severity.  No mitigating or exacerbating factors reported.  Associated sxs include SOB, chills, subjective fever, nasal and chest congestion, sore throat, productive cough, body aches, and fatigue.  Patient denies any CP, HA, syncope, diaphoresis, diarrhea, dysuria, flank pain, dizziness, light-headedness, numbness, seizures, and all other sxs at this time.  No further complaints or concerns at this time.  ER workup showed:  Hypoxia with an SaO2 of 85% on RA, and mild tachycardia, otherwise stable VS.  CBC showed WBCs elevated to 12.8k, otherwise unremarkable.  CMP unremarkable.  Troponin negative.  Lactic acid and Procalcitonin both normal.  UA negative.  ABG showed ph 7.00, Pco2 44.9, Po2 59, HCo3 27.8 on RA.  CXR negative for acute process.  CT head showed no CT evidence of an acute intracranial process. Age-appropriate atrophy with mild deep white matter microangiopathy.  Patchy ethmoid mucosal thickening.  Abdominal Xray showed nonobstructive bowel gas pattern.  CT chest showed multifocal infectious bronchiolitis.  No effusions.  BC were drawn and patient was given Rocephin and  Azithromycin in the ER and Hospital Medicine called for admission.  Influenza pending.  She will be admitted to Tele.  Patient is a Full Code.  Her SDM is her  Enrike who can be reached at [phone].    Overview/Hospital Course:  On 1/4 patient was admitted to the telemetry unit secondary to Sepsis and Acute hypoxemic respiratory failure thought to be related to infectious bronchiolitis.  BC were drawn and patient was started on scheduled nebulizer treatments in addition to Solu-Medrol IVP every 6 hr, IV ABX, and supplemental oxygen.      As of 1/5 Patient reports improvement in shortness of breath but continues to have wheezing.  BC show NGTD.  Sputum Cx shows normal respiratory ramses.  Influenza negative.  Plan to continue IV antibiotics in addition to nebulizer treatments and IV steroids.  May need pulmonary consult pending clinical course.    Interval History:  No acute events overnight.  Currently resting comfortably in bed with  visiting at bedside.  Patient reports improvement in shortness of breath but continues to have wheezing.  BC show NGTD.  Sputum Cx shows normal respiratory ramses.  Influenza negative.  Plan to continue IV antibiotics in addition to nebulizer treatments and IV steroids.  May need pulmonary consult pending clinical course.    Review of Systems   Constitutional: Positive for activity change and fatigue. Negative for chills, diaphoresis and fever.   HENT: Positive for congestion, postnasal drip and sore throat. Negative for facial swelling, hearing loss, mouth sores, sneezing, tinnitus and trouble swallowing.    Eyes: Negative for photophobia, pain, discharge, redness and visual disturbance.   Respiratory: Positive for cough, shortness of breath and wheezing. Negative for apnea, choking, chest tightness and stridor.         Productive cough with gray sputum.   Cardiovascular: Negative for chest pain, palpitations and leg swelling.   Gastrointestinal: Negative for abdominal  distention, abdominal pain, anal bleeding, blood in stool, constipation, diarrhea, nausea, rectal pain and vomiting.   Endocrine: Negative for cold intolerance, heat intolerance, polydipsia, polyphagia and polyuria.   Genitourinary: Negative for difficulty urinating, dysuria, flank pain, frequency, hematuria, pelvic pain, urgency, vaginal bleeding, vaginal discharge and vaginal pain.   Musculoskeletal: Positive for arthralgias and myalgias. Negative for back pain, gait problem and neck stiffness.   Skin: Negative for pallor, rash and wound.   Allergic/Immunologic: Negative for food allergies.   Neurological: Negative for dizziness, tremors, seizures, syncope, speech difficulty, weakness and headaches.   Hematological: Negative for adenopathy. Does not bruise/bleed easily.   Psychiatric/Behavioral: Negative for behavioral problems and confusion. The patient is not nervous/anxious.    All other systems reviewed and are negative.    Objective:     Vital Signs (Most Recent):  Temp: 97.6 °F (36.4 °C) (01/05/20 1126)  Pulse: 78 (01/05/20 1146)  Resp: 16 (01/05/20 1146)  BP: (!) 141/73 (01/05/20 1126)  SpO2: (!) 94 % (01/05/20 1146) Vital Signs (24h Range):  Temp:  [97.6 °F (36.4 °C)-98.1 °F (36.7 °C)] 97.6 °F (36.4 °C)  Pulse:  [65-90] 78  Resp:  [16-20] 16  SpO2:  [91 %-100 %] 94 %  BP: (131-186)/(61-86) 141/73     Weight: 96.7 kg (213 lb 3 oz)  Body mass index is 33.39 kg/m².    Intake/Output Summary (Last 24 hours) at 1/5/2020 1155  Last data filed at 1/5/2020 0600  Gross per 24 hour   Intake 118 ml   Output --   Net 118 ml      Physical Exam   Constitutional: She is oriented to person, place, and time. She appears well-developed and well-nourished.   Morbidly obese CF resting comfortably in bed in NAD.   HENT:   Head: Normocephalic and atraumatic.   Mouth/Throat: Oropharynx is clear and moist.   Eyes: Pupils are equal, round, and reactive to light. Conjunctivae and EOM are normal.   Neck: Normal range of motion. Neck  supple.   Cardiovascular: Normal rate, regular rhythm, normal heart sounds and intact distal pulses. Exam reveals no gallop and no friction rub.   No murmur heard.  Pulmonary/Chest: Effort normal. No respiratory distress. She has wheezes. She has no rales. She exhibits no tenderness.   Conversational dyspnea is improving; Coarse BBS with scattered rhonchi and wheezing throughout; comfortable on 2L NC.   Abdominal: Soft. Bowel sounds are normal. She exhibits no distension and no mass. There is no tenderness.   Musculoskeletal: Normal range of motion. She exhibits no edema or tenderness.   Neurological: She is alert and oriented to person, place, and time.   Skin: Skin is warm and dry. No rash noted. No erythema.   Psychiatric: She has a normal mood and affect. Her behavior is normal. Judgment and thought content normal.   Nursing note and vitals reviewed.      Significant Labs:   Blood Culture:   Recent Labs   Lab 01/04/20  1055 01/04/20  1109   LABBLOO No Growth to date No Growth to date     BMP:   Recent Labs   Lab 01/05/20  0406   *      K 4.0      CO2 26   BUN 17   CREATININE 0.7   CALCIUM 9.1     CBC:   Recent Labs   Lab 01/04/20  0818 01/05/20  0406   WBC 12.83* 9.89   HGB 14.2 12.8   HCT 44.2 40.2    208     TSH:   Recent Labs   Lab 01/05/20  0406   TSH 0.742       Significant Imaging: I have reviewed all pertinent imaging results/findings within the past 24 hours.      Assessment/Plan:      * Acute hypoxemic respiratory failure  - Admitted to [facility]  - Likely secondary to infectious bronchiolitis  - Influenza negative  - Continue supplemental O2  - Continue IV ABX and Solumedrol  - Scheduled duonebs  - Will need home O2 eval prior to DC      Bronchiolitis, acute  - BC show NGTD  - Sputum cx shows normal respiratory ramses  - Continue IV ABX  - Solumedrol q 6 hours  - Supplemental O2  - Acapella  - Scheduled Duonebs  - May need pulmonary consult pending clinical course  -  Monitor      Sepsis  - Likely secondary to infectious bronchiolitis  - Lactic acid normal  - Tachycardia and leukocytosis have now resolved  - BC show NGTD  - Continue IV ABX  - Currently hemodynamically stable      Abdominal pain  - N/V has now resolved  - Xray showed non-obstructive bowel gas pattern  - Lipase normal  - p.r.n. Antiemetics  - p.r.n. Analgesia  - Cardiac diet as tolerated  - monitor      HTN (hypertension)  - BP under fair control  - Continue home meds  - monitor and adjust as needed      Hypothyroidism  - TSH normal  - Continue home Synthroid     History of pulmonary embolism  - Onset approximately 8 years ago from DVT  - Compliant with home Eliquis, will continue  - Monitor        VTE Risk Mitigation (From admission, onward)         Ordered     apixaban tablet 5 mg  2 times daily      01/04/20 1455     IP VTE HIGH RISK PATIENT  Once      01/04/20 1154     Place sequential compression device  Until discontinued      01/04/20 1154                      Cherelle Sierra, LATANYA, ACNP-BC  Department of Hospital Medicine   [facility]

## 2020-01-05 NOTE — PLAN OF CARE
Patient AAOx4. VSS..   Patient remained afebrile throughout the shift.  Heart rate closely monitored   Patient SR-SB on monitor.  O2 @2LNC  Duonebs/Acapella per RT  Patient remained free of falls this shift.  Plan of care reviewed.  Patient verbalized understanding.  Patient moving/turning independently  Frequent weight shifting encouraged.  Bed low, siderails up x2, wheels locked, call light in reach.  Patient instructed to call for assistance.  Hourly rounding completed.  Will continue to monitor.

## 2020-01-05 NOTE — PLAN OF CARE
Care plan reviewed with patient. Lung sounds still not clear, with episodes of productive cough. Free from falls. Still on antibiotics. No other complaints made.

## 2020-01-05 NOTE — PLAN OF CARE
CM spoke to patient who is awake, alert, and able to make needs known. Patient states that before she came to the hospital she was not using any oxygen at home, and has equipment a walker, and cane, and a Rollator but they do not use them  regularly.  Patient reports that her  is her help at home but he is currently in the hospital with her. Patient reports that she was coming to the hospital with her  because he was ill, and she had to be admitted also. Patients  is in a wheelchair by the patients bedside. Patient reports that when she discharges she will be going home and at this time she does not feel like she needs any assistance. CM provided a transitional care folder, information on advanced directives, information on pharmacy bedside delivery, and discharge planning begins on admission with contact information for any needs/questions.    D/C Plan: Home   PCP: Dr. LU Queen   Preferred Pharmacy: [facility]   Discharge transportation: Unknown   My [facility]:   Pharmacy Bedside Delivery:yes       [facility] [employer] [facility] [address] [place] [address] [facility]  [address] [facility] [address]  Phone: [phone] Fax: [phone]       01/05/20 1105   Discharge Assessment   Assessment Type Discharge Planning Assessment   Confirmed/corrected address and phone number on facesheet? Yes   Assessment information obtained from? Patient   Expected Length of Stay (days)   (TBD )   Communicated expected length of stay with patient/caregiver yes   Prior to hospitilization cognitive status: Alert/Oriented   Prior to hospitalization functional status: Independent   Current cognitive status: Alert/Oriented   Current Functional Status: Independent   Facility Arrived From: Home    Lives With spouse   Able to Return to Prior Arrangements yes   Is patient able to care for self after discharge? Yes   Who are your caregiver(s) and their phone number(s)? Anthony (son) [phone]   Patient's perception  of discharge disposition home or selfcare   Patient currently being followed by outpatient case management? No   Patient currently receives any other outside agency services? Yes   Name and contact number of agency or person providing outside services [facility]    Is it the patient/care giver preference to resume care with the current outside agency? Yes   Equipment Currently Used at Home none   Do you have any problems affording any of your prescribed medications? No   Is the patient taking medications as prescribed? yes   Does the patient have transportation home? Yes   Transportation Anticipated car, drives self   Does the patient receive services at the [facility]? No   Discharge Plan A Home with family;Home Health   DME Needed Upon Discharge  none   Patient/Family in Agreement with Plan yes

## 2020-01-05 NOTE — SUBJECTIVE & OBJECTIVE
Interval History:  No acute events overnight.  Currently resting comfortably in bed with  visiting at bedside.  Patient reports improvement in shortness of breath but continues to have wheezing.  BC show NGTD.  Sputum Cx shows normal respiratory ramses.  Influenza negative.  Plan to continue IV antibiotics in addition to nebulizer treatments and IV steroids.  May need pulmonary consult pending clinical course.    Review of Systems   Constitutional: Positive for activity change and fatigue. Negative for chills, diaphoresis and fever.   HENT: Positive for congestion, postnasal drip and sore throat. Negative for facial swelling, hearing loss, mouth sores, sneezing, tinnitus and trouble swallowing.    Eyes: Negative for photophobia, pain, discharge, redness and visual disturbance.   Respiratory: Positive for cough, shortness of breath and wheezing. Negative for apnea, choking, chest tightness and stridor.         Productive cough with gray sputum.   Cardiovascular: Negative for chest pain, palpitations and leg swelling.   Gastrointestinal: Negative for abdominal distention, abdominal pain, anal bleeding, blood in stool, constipation, diarrhea, nausea, rectal pain and vomiting.   Endocrine: Negative for cold intolerance, heat intolerance, polydipsia, polyphagia and polyuria.   Genitourinary: Negative for difficulty urinating, dysuria, flank pain, frequency, hematuria, pelvic pain, urgency, vaginal bleeding, vaginal discharge and vaginal pain.   Musculoskeletal: Positive for arthralgias and myalgias. Negative for back pain, gait problem and neck stiffness.   Skin: Negative for pallor, rash and wound.   Allergic/Immunologic: Negative for food allergies.   Neurological: Negative for dizziness, tremors, seizures, syncope, speech difficulty, weakness and headaches.   Hematological: Negative for adenopathy. Does not bruise/bleed easily.   Psychiatric/Behavioral: Negative for behavioral problems and confusion. The patient  is not nervous/anxious.    All other systems reviewed and are negative.    Objective:     Vital Signs (Most Recent):  Temp: 97.6 °F (36.4 °C) (01/05/20 1126)  Pulse: 78 (01/05/20 1146)  Resp: 16 (01/05/20 1146)  BP: (!) 141/73 (01/05/20 1126)  SpO2: (!) 94 % (01/05/20 1146) Vital Signs (24h Range):  Temp:  [97.6 °F (36.4 °C)-98.1 °F (36.7 °C)] 97.6 °F (36.4 °C)  Pulse:  [65-90] 78  Resp:  [16-20] 16  SpO2:  [91 %-100 %] 94 %  BP: (131-186)/(61-86) 141/73     Weight: 96.7 kg (213 lb 3 oz)  Body mass index is 33.39 kg/m².    Intake/Output Summary (Last 24 hours) at 1/5/2020 1155  Last data filed at 1/5/2020 0600  Gross per 24 hour   Intake 118 ml   Output --   Net 118 ml      Physical Exam   Constitutional: She is oriented to person, place, and time. She appears well-developed and well-nourished.   Morbidly obese CF resting comfortably in bed in NAD.   HENT:   Head: Normocephalic and atraumatic.   Mouth/Throat: Oropharynx is clear and moist.   Eyes: Pupils are equal, round, and reactive to light. Conjunctivae and EOM are normal.   Neck: Normal range of motion. Neck supple.   Cardiovascular: Normal rate, regular rhythm, normal heart sounds and intact distal pulses. Exam reveals no gallop and no friction rub.   No murmur heard.  Pulmonary/Chest: Effort normal. No respiratory distress. She has wheezes. She has no rales. She exhibits no tenderness.   Conversational dyspnea is improving; Coarse BBS with scattered rhonchi and wheezing throughout; comfortable on 2L NC.   Abdominal: Soft. Bowel sounds are normal. She exhibits no distension and no mass. There is no tenderness.   Musculoskeletal: Normal range of motion. She exhibits no edema or tenderness.   Neurological: She is alert and oriented to person, place, and time.   Skin: Skin is warm and dry. No rash noted. No erythema.   Psychiatric: She has a normal mood and affect. Her behavior is normal. Judgment and thought content normal.   Nursing note and vitals  reviewed.      Significant Labs:   Blood Culture:   Recent Labs   Lab 01/04/20  1055 01/04/20  1109   LABBLOO No Growth to date No Growth to date     BMP:   Recent Labs   Lab 01/05/20  0406   *      K 4.0      CO2 26   BUN 17   CREATININE 0.7   CALCIUM 9.1     CBC:   Recent Labs   Lab 01/04/20  0818 01/05/20  0406   WBC 12.83* 9.89   HGB 14.2 12.8   HCT 44.2 40.2    208     TSH:   Recent Labs   Lab 01/05/20  0406   TSH 0.742       Significant Imaging: I have reviewed all pertinent imaging results/findings within the past 24 hours.

## 2020-01-06 PROBLEM — R73.9 HYPERGLYCEMIA: Status: ACTIVE | Noted: 2020-01-06

## 2020-01-06 PROBLEM — R10.9 ABDOMINAL PAIN: Status: RESOLVED | Noted: 2020-01-04 | Resolved: 2020-01-06

## 2020-01-06 PROBLEM — I48.91 ATRIAL FIBRILLATION: Status: ACTIVE | Noted: 2020-01-06

## 2020-01-06 LAB
ANION GAP SERPL CALC-SCNC: 14 MMOL/L (ref 8–16)
BACTERIA SPEC AEROBE CULT: NORMAL
BACTERIA SPEC AEROBE CULT: NORMAL
BASOPHILS # BLD AUTO: 0.03 K/UL (ref 0–0.2)
BASOPHILS NFR BLD: 0.2 % (ref 0–1.9)
BUN SERPL-MCNC: 18 MG/DL (ref 8–23)
CALCIUM SERPL-MCNC: 9.7 MG/DL (ref 8.7–10.5)
CHLORIDE SERPL-SCNC: 98 MMOL/L (ref 95–110)
CO2 SERPL-SCNC: 30 MMOL/L (ref 23–29)
CREAT SERPL-MCNC: 0.7 MG/DL (ref 0.5–1.4)
DIASTOLIC DYSFUNCTION: NO
DIFFERENTIAL METHOD: ABNORMAL
EOSINOPHIL # BLD AUTO: 0 K/UL (ref 0–0.5)
EOSINOPHIL NFR BLD: 0 % (ref 0–8)
ERYTHROCYTE [DISTWIDTH] IN BLOOD BY AUTOMATED COUNT: 12.1 % (ref 11.5–14.5)
EST. GFR  (AFRICAN AMERICAN): >60 ML/MIN/1.73 M^2
EST. GFR  (NON AFRICAN AMERICAN): >60 ML/MIN/1.73 M^2
GLUCOSE SERPL-MCNC: 241 MG/DL (ref 70–110)
GRAM STN SPEC: NORMAL
HCT VFR BLD AUTO: 40.9 % (ref 37–48.5)
HGB BLD-MCNC: 13 G/DL (ref 12–16)
IMM GRANULOCYTES # BLD AUTO: 0.16 K/UL (ref 0–0.04)
IMM GRANULOCYTES NFR BLD AUTO: 1.2 % (ref 0–0.5)
LYMPHOCYTES # BLD AUTO: 1.1 K/UL (ref 1–4.8)
LYMPHOCYTES NFR BLD: 8.7 % (ref 18–48)
MCH RBC QN AUTO: 28.6 PG (ref 27–31)
MCHC RBC AUTO-ENTMCNC: 31.8 G/DL (ref 32–36)
MCV RBC AUTO: 90 FL (ref 82–98)
MONOCYTES # BLD AUTO: 0.6 K/UL (ref 0.3–1)
MONOCYTES NFR BLD: 4.2 % (ref 4–15)
NEUTROPHILS # BLD AUTO: 11.2 K/UL (ref 1.8–7.7)
NEUTROPHILS NFR BLD: 85.7 % (ref 38–73)
NRBC BLD-RTO: 0 /100 WBC
PLATELET # BLD AUTO: 225 K/UL (ref 150–350)
PMV BLD AUTO: 10.1 FL (ref 9.2–12.9)
POCT GLUCOSE: 174 MG/DL (ref 70–110)
POCT GLUCOSE: 215 MG/DL (ref 70–110)
POCT GLUCOSE: 241 MG/DL (ref 70–110)
POTASSIUM SERPL-SCNC: 4.4 MMOL/L (ref 3.5–5.1)
RBC # BLD AUTO: 4.54 M/UL (ref 4–5.4)
RETIRED EF AND QEF - SEE NOTES: 60 (ref 55–65)
SODIUM SERPL-SCNC: 142 MMOL/L (ref 136–145)
WBC # BLD AUTO: 13.07 K/UL (ref 3.9–12.7)

## 2020-01-06 PROCEDURE — 87632 RESP VIRUS 6-11 TARGETS: CPT

## 2020-01-06 PROCEDURE — 25000003 PHARM REV CODE 250: Performed by: NURSE PRACTITIONER

## 2020-01-06 PROCEDURE — 94640 AIRWAY INHALATION TREATMENT: CPT

## 2020-01-06 PROCEDURE — 25000003 PHARM REV CODE 250: Performed by: INTERNAL MEDICINE

## 2020-01-06 PROCEDURE — C8929 TTE W OR WO FOL WCON,DOPPLER: HCPCS

## 2020-01-06 PROCEDURE — 63600175 PHARM REV CODE 636 W HCPCS: Performed by: NURSE PRACTITIONER

## 2020-01-06 PROCEDURE — 94664 DEMO&/EVAL PT USE INHALER: CPT

## 2020-01-06 PROCEDURE — 99222 1ST HOSP IP/OBS MODERATE 55: CPT | Mod: ,,, | Performed by: INTERNAL MEDICINE

## 2020-01-06 PROCEDURE — 99222 PR INITIAL HOSPITAL CARE,LEVL II: ICD-10-PCS | Mod: ,,, | Performed by: INTERNAL MEDICINE

## 2020-01-06 PROCEDURE — 93306 2D ECHO WITH COLOR FLOW DOPPLER: ICD-10-PCS | Mod: 26,,, | Performed by: INTERNAL MEDICINE

## 2020-01-06 PROCEDURE — 93010 EKG 12-LEAD: ICD-10-PCS | Mod: ,,, | Performed by: INTERNAL MEDICINE

## 2020-01-06 PROCEDURE — 36415 COLL VENOUS BLD VENIPUNCTURE: CPT

## 2020-01-06 PROCEDURE — 85025 COMPLETE CBC W/AUTO DIFF WBC: CPT

## 2020-01-06 PROCEDURE — 21400001 HC TELEMETRY ROOM

## 2020-01-06 PROCEDURE — 83036 HEMOGLOBIN GLYCOSYLATED A1C: CPT

## 2020-01-06 PROCEDURE — 99900035 HC TECH TIME PER 15 MIN (STAT)

## 2020-01-06 PROCEDURE — 25000242 PHARM REV CODE 250 ALT 637 W/ HCPCS: Performed by: NURSE PRACTITIONER

## 2020-01-06 PROCEDURE — 93306 TTE W/DOPPLER COMPLETE: CPT | Mod: 26,,, | Performed by: INTERNAL MEDICINE

## 2020-01-06 PROCEDURE — 80048 BASIC METABOLIC PNL TOTAL CA: CPT

## 2020-01-06 PROCEDURE — 93010 ELECTROCARDIOGRAM REPORT: CPT | Mod: ,,, | Performed by: INTERNAL MEDICINE

## 2020-01-06 PROCEDURE — 27000646 HC AEROBIKA DEVICE

## 2020-01-06 PROCEDURE — 93005 ELECTROCARDIOGRAM TRACING: CPT

## 2020-01-06 RX ORDER — INSULIN ASPART 100 [IU]/ML
0-5 INJECTION, SOLUTION INTRAVENOUS; SUBCUTANEOUS
Status: DISCONTINUED | OUTPATIENT
Start: 2020-01-06 | End: 2020-01-09 | Stop reason: HOSPADM

## 2020-01-06 RX ORDER — METOPROLOL SUCCINATE 25 MG/1
25 TABLET, EXTENDED RELEASE ORAL DAILY
Status: DISCONTINUED | OUTPATIENT
Start: 2020-01-06 | End: 2020-01-09 | Stop reason: HOSPADM

## 2020-01-06 RX ADMIN — INSULIN ASPART 2 UNITS: 100 INJECTION, SOLUTION INTRAVENOUS; SUBCUTANEOUS at 04:01

## 2020-01-06 RX ADMIN — IPRATROPIUM BROMIDE AND ALBUTEROL SULFATE 3 ML: .5; 3 SOLUTION RESPIRATORY (INHALATION) at 03:01

## 2020-01-06 RX ADMIN — METHYLPREDNISOLONE SODIUM SUCCINATE 80 MG: 40 INJECTION, POWDER, FOR SOLUTION INTRAMUSCULAR; INTRAVENOUS at 06:01

## 2020-01-06 RX ADMIN — APIXABAN 5 MG: 2.5 TABLET, FILM COATED ORAL at 09:01

## 2020-01-06 RX ADMIN — CEFTRIAXONE 1 G: 1 INJECTION, SOLUTION INTRAVENOUS at 10:01

## 2020-01-06 RX ADMIN — METOPROLOL SUCCINATE 25 MG: 25 TABLET, EXTENDED RELEASE ORAL at 12:01

## 2020-01-06 RX ADMIN — METHYLPREDNISOLONE SODIUM SUCCINATE 80 MG: 40 INJECTION, POWDER, FOR SOLUTION INTRAMUSCULAR; INTRAVENOUS at 12:01

## 2020-01-06 RX ADMIN — APIXABAN 5 MG: 2.5 TABLET, FILM COATED ORAL at 10:01

## 2020-01-06 RX ADMIN — HYDROCHLOROTHIAZIDE 25 MG: 25 TABLET ORAL at 09:01

## 2020-01-06 RX ADMIN — IPRATROPIUM BROMIDE AND ALBUTEROL SULFATE 3 ML: .5; 3 SOLUTION RESPIRATORY (INHALATION) at 07:01

## 2020-01-06 RX ADMIN — POLYETHYLENE GLYCOL (3350) 17 G: 17 POWDER, FOR SOLUTION ORAL at 09:01

## 2020-01-06 RX ADMIN — INSULIN ASPART 2 UNITS: 100 INJECTION, SOLUTION INTRAVENOUS; SUBCUTANEOUS at 12:01

## 2020-01-06 RX ADMIN — AZITHROMYCIN MONOHYDRATE 500 MG: 500 INJECTION, POWDER, LYOPHILIZED, FOR SOLUTION INTRAVENOUS at 12:01

## 2020-01-06 RX ADMIN — LEVOTHYROXINE SODIUM 50 MCG: 50 TABLET ORAL at 06:01

## 2020-01-06 RX ADMIN — IPRATROPIUM BROMIDE AND ALBUTEROL SULFATE 3 ML: .5; 3 SOLUTION RESPIRATORY (INHALATION) at 12:01

## 2020-01-06 RX ADMIN — METHYLPREDNISOLONE SODIUM SUCCINATE 40 MG: 40 INJECTION, POWDER, FOR SOLUTION INTRAMUSCULAR; INTRAVENOUS at 10:01

## 2020-01-06 RX ADMIN — MELATONIN 6 MG: at 12:01

## 2020-01-06 RX ADMIN — METHYLPREDNISOLONE SODIUM SUCCINATE 40 MG: 40 INJECTION, POWDER, FOR SOLUTION INTRAMUSCULAR; INTRAVENOUS at 01:01

## 2020-01-06 RX ADMIN — PANTOPRAZOLE SODIUM 40 MG: 40 TABLET, DELAYED RELEASE ORAL at 09:01

## 2020-01-06 RX ADMIN — GUAIFENESIN 600 MG: 600 TABLET, EXTENDED RELEASE ORAL at 09:01

## 2020-01-06 RX ADMIN — LISINOPRIL 20 MG: 20 TABLET ORAL at 09:01

## 2020-01-06 RX ADMIN — GUAIFENESIN 600 MG: 600 TABLET, EXTENDED RELEASE ORAL at 10:01

## 2020-01-06 NOTE — PLAN OF CARE
01/06/20 1023   Medicare Message   Important Message from Medicare regarding Discharge Appeal Rights Given to patient/caregiver;Explained to patient/caregiver;Signed/date by patient/caregiver   Date IMM was signed 01/06/20   Time IMM was signed [id number]

## 2020-01-06 NOTE — NURSING
Pt appears to have converted from SR to AFIB with controlled rate. Pt has H/O Afib. T Pacheco NP notified. EKG ordered. Will continue to monitor.

## 2020-01-06 NOTE — ASSESSMENT & PLAN NOTE
- New onset  - HR around 115-120  - 12 lead EKG today showed Afib with RVR  - Would hold off on initiating beta-blocker due to wheezing  - Continue home Eliquis for CVA prophylaxis  - Cardiology consult pending for additional recommendations  - Tele monitoring

## 2020-01-06 NOTE — ASSESSMENT & PLAN NOTE
- Ai1 pending  - Likely secondary to steroids given no history of DM  - Accu checks ACHS with SSI PRN  - Expected to improve with weaning of Solu-Medrol

## 2020-01-06 NOTE — ASSESSMENT & PLAN NOTE
-Unsure if patient with new onset A-fib or PAF.   -Currently admitted with pulmonary infection which is likely the trigger for her A-fib.   -HR currently controlled  -Is already on NOAC for history of PE  -Will need to continue for CVA prophylaxis  -2D Echo   -Request clinic notes from Dr. Gao at [facility]  -Add Toprol 25mg daily

## 2020-01-06 NOTE — HPI
Aurea Harper is a 87 y.o. female patient with a PMHx of HTN and pulmonary embolus 8 years ago on long term OAC with Eliquis, who presented to the Emergency Department today for evaluation of abdominal pain and vomiting, which onset gradually 2-3 weeks PTA.  Patient reports that she has been feeling sick for the past few weeks and received a steroid shot 2 days ago, but is still experiencing sxs including SOB, chills, subjective fever, nasal and chest congestion, sore throat, productive cough, body aches, and fatigue.  She has also been on Levaquin.     ER workup showed:  Hypoxia with an SaO2 of 85% on RA, and mild tachycardia, otherwise stable VS.  CBC showed WBCs elevated to 12.8k, otherwise unremarkable.  CMP unremarkable.  Troponin negative.  Lactic acid and Procalcitonin both normal.  UA negative.  ABG showed ph 7.00, Pco2 44.9, Po2 59, HCo3 27.8 on RA.  CXR negative for acute process.  CT head showed no CT evidence of an acute intracranial process. Abdominal Xray showed nonobstructive bowel gas pattern.  CT chest showed multifocal infectious bronchiolitis.  No effusions.   Patient current admitted with bronchiolitis, sepsis.   Cardiology consulted for A-fib. Patient doesn't recall being diagnosed with A-fib in the past. Is followed by Dr. Gao.

## 2020-01-06 NOTE — ASSESSMENT & PLAN NOTE
- BC show NGTD  - Sputum cx shows normal respiratory ramses  - Viral panel pending  - Continue IV ABX  - Change Solumedrol to 40 mg IVP q 8 hours  - Supplemental O2  - Acapella  - Scheduled Duonebs  - May need pulmonary consult pending clinical course  - Monitor

## 2020-01-06 NOTE — CONSULTS
[facility] -   Cardiology  Consult Note    Patient Name: Aurea Harper  MRN: [id number]  Admission Date: 1/4/2020  Hospital Length of Stay: 2 days  Code Status: Full Code   Attending Provider: Gt Deleon, *   Consulting Provider: JUAN CARLOS Manuel  Primary Care Physician: Primary Doctor No  Principal Problem:Acute hypoxemic respiratory failure    Patient information was obtained from patient and ER records.     Inpatient consult to Cardiology  Consult performed by: JUAN CARLOS Khan  Consult ordered by: Cherelle Sierra NP  Reason for consult: A-fib         Subjective:     Chief Complaint:  Cough, congestion and fatigue      HPI:   Aurea Harper is a 87 y.o. female patient with a PMHx of HTN and pulmonary embolus 8 years ago on long term OAC with Eliquis, who presented to the Emergency Department today for evaluation of abdominal pain and vomiting, which onset gradually 2-3 weeks PTA.  Patient reports that she has been feeling sick for the past few weeks and received a steroid shot 2 days ago, but is still experiencing sxs including SOB, chills, subjective fever, nasal and chest congestion, sore throat, productive cough, body aches, and fatigue.  She has also been on Levaquin.     ER workup showed:  Hypoxia with an SaO2 of 85% on RA, and mild tachycardia, otherwise stable VS.  CBC showed WBCs elevated to 12.8k, otherwise unremarkable.  CMP unremarkable.  Troponin negative.  Lactic acid and Procalcitonin both normal.  UA negative.  ABG showed ph 7.00, Pco2 44.9, Po2 59, HCo3 27.8 on RA.  CXR negative for acute process.  CT head showed no CT evidence of an acute intracranial process. Abdominal Xray showed nonobstructive bowel gas pattern.  CT chest showed multifocal infectious bronchiolitis.  No effusions.   Patient current admitted with bronchiolitis, sepsis.   Cardiology consulted for A-fib. Patient doesn't recall being diagnosed with A-fib in the past. Is followed by   Sima.     Past Medical History:   Diagnosis Date    Abdominal pain 1/4/2020    Hypertension     Irregular heart rhythm     Mitral valve prolapse     Pulmonary embolus        Past Surgical History:   Procedure Laterality Date    BACK SURGERY      EYE SURGERY      HYSTERECTOMY      TOTAL KNEE ARTHROPLASTY         Review of patient's allergies indicates:   Allergen Reactions    Pcn [penicillins]     Sulfa (sulfonamide antibiotics)        No current facility-administered medications on file prior to encounter.      Current Outpatient Medications on File Prior to Encounter   Medication Sig    alprazolam (XANAX) 0.5 MG tablet Take 0.5 mg by mouth nightly as needed.     apixaban (ELIQUIS) 5 mg Tab Take by mouth 2 (two) times daily.     ergocalciferol (ERGOCALCIFEROL) 50,000 unit Cap Take 50,000 Units by mouth every 7 days.    hydrochlorothiazide (HYDRODIURIL) 25 MG tablet Take 25 mg by mouth once daily.    hydrocodone-acetaminophen 5-325mg (NORCO) 5-325 mg per tablet Take 1 tablet by mouth every 6 (six) hours as needed for Pain.    levoFLOXacin (LEVAQUIN) 500 MG tablet Take 500 mg by mouth once daily.    levothyroxine (SYNTHROID) 50 MCG tablet Take 50 mcg by mouth before breakfast.     linaCLOtide (LINZESS) 290 mcg Cap capsule as needed.     lisinopril (PRINIVIL,ZESTRIL) 20 MG tablet Take 20 mg by mouth once daily.    omeprazole (PRILOSEC) 20 MG capsule Take 20 mg by mouth once daily.     Family History     None        Tobacco Use    Smoking status: Never Smoker   Substance and Sexual Activity    Alcohol use: No    Drug use: No    Sexual activity: Not Currently     Review of Systems   Constitution: Positive for malaise/fatigue. Negative for diaphoresis, weight gain and weight loss.   HENT: Negative for congestion and nosebleeds.    Cardiovascular: Positive for leg swelling. Negative for chest pain, claudication, cyanosis, dyspnea on exertion, irregular heartbeat, near-syncope, orthopnea,  palpitations, paroxysmal nocturnal dyspnea and syncope.   Respiratory: Positive for cough, shortness of breath and sputum production. Negative for hemoptysis, sleep disturbances due to breathing, snoring and wheezing.    Hematologic/Lymphatic: Negative for bleeding problem. Does not bruise/bleed easily.   Skin: Negative for rash.   Musculoskeletal: Negative for arthritis, back pain, falls, joint pain, muscle cramps and muscle weakness.   Gastrointestinal: Negative for abdominal pain, constipation, diarrhea, heartburn, hematemesis, hematochezia, melena, nausea and vomiting.   Genitourinary: Negative for dysuria, hematuria and nocturia.   Neurological: Negative for excessive daytime sleepiness, dizziness, headaches, light-headedness, loss of balance, numbness, vertigo and weakness.     Objective:     Vital Signs (Most Recent):  Temp: 97.2 °F (36.2 °C) (01/06/20 1104)  Pulse: 103 (01/06/20 1104)  Resp: 20 (01/06/20 1104)  BP: 126/69 (01/06/20 1104)  SpO2: (!) 92 % (01/06/20 1104) Vital Signs (24h Range):  Temp:  [97.2 °F (36.2 °C)-98 °F (36.7 °C)] 97.2 °F (36.2 °C)  Pulse:  [] 103  Resp:  [16-20] 20  SpO2:  [92 %-96 %] 92 %  BP: (126-147)/(62-93) 126/69     Weight: 99.4 kg (219 lb 2.2 oz)  Body mass index is 34.32 kg/m².    SpO2: (!) 92 %  O2 Device (Oxygen Therapy): room air      Intake/Output Summary (Last 24 hours) at 1/6/2020 1215  Last data filed at 1/6/2020 0600  Gross per 24 hour   Intake 1228 ml   Output --   Net 1228 ml       Lines/Drains/Airways     Peripheral Intravenous Line                 Peripheral IV - Single Lumen 01/04/20 1453 20 G Left Antecubital 1 day         Peripheral IV - Single Lumen 01/05/20 1343 20 G Left;Posterior Forearm less than 1 day                Physical Exam   Constitutional: She is oriented to person, place, and time. She appears well-developed and well-nourished.   Neck: Neck supple. No JVD present.   Cardiovascular: Normal rate, normal heart sounds and normal pulses. An  irregularly irregular rhythm present. Exam reveals no friction rub.   No murmur heard.  Pulmonary/Chest: Effort normal. No respiratory distress. She has decreased breath sounds. She has no wheezes. She has rhonchi. She has no rales.   Abdominal: Soft. Bowel sounds are normal. She exhibits no distension.   Musculoskeletal: She exhibits edema ( trace bilaterally ). She exhibits no tenderness.   Neurological: She is alert and oriented to person, place, and time.   Skin: Skin is warm and dry. No rash noted.   Psychiatric: She has a normal mood and affect. Her behavior is normal.   Nursing note and vitals reviewed.      Significant Labs:   All pertinent lab results from the last 24 hours have been reviewed. and   Recent Lab Results       01/06/20  1157   01/06/20  0320        Anion Gap   14     Baso #   0.03     Basophil%   0.2     BUN, Bld   18     Calcium   9.7     Chloride   98     CO2   30     Creatinine   0.7     Differential Method   Automated     eGFR if    >60     eGFR if non    >60  Comment:  Calculation used to obtain the estimated glomerular filtration  rate (eGFR) is the CKD-EPI equation.        Eos #   0.0     Eosinophil%   0.0     Glucose   241     Gran # (ANC)   11.2     Gran%   85.7     Hematocrit   40.9     Hemoglobin   13.0     Immature Grans (Abs)   0.16  Comment:  Mild elevation in immature granulocytes is non specific and   can be seen in a variety of conditions including stress response,   acute inflammation, trauma and pregnancy. Correlation with other   laboratory and clinical findings is essential.       Immature Granulocytes   1.2     Lymph #   1.1     Lymph%   8.7     MCH   28.6     MCHC   31.8     MCV   90     Mono #   0.6     Mono%   4.2     MPV   10.1     nRBC   0     Platelets   225     POCT Glucose 241       Potassium   4.4     RBC   4.54     RDW   12.1     Sodium   142     WBC   13.07           Significant Imaging: Echocardiogram:   2D echo with color flow  doppler:   Results for orders placed or performed during the hospital encounter of 01/04/20   2D echo with color flow doppler    Narrative    This study is in progress....     Assessment and Plan:     Atrial fibrillation  -Unsure if patient with new onset A-fib or PAF.   -Currently admitted with pulmonary infection which is likely the trigger for her A-fib.   -HR currently controlled  -Is already on NOAC for history of PE  -Will need to continue for CVA prophylaxis  -2D Echo   -Request clinic notes from Dr. Gao at [facility]  -Add Toprol 25mg daily           VTE Risk Mitigation (From admission, onward)         Ordered     apixaban tablet 5 mg  2 times daily      01/04/20 1455     IP VTE HIGH RISK PATIENT  Once      01/04/20 1154     Place sequential compression device  Until discontinued      01/04/20 1154              Chart reviewed. Patient examined by Dr. Mckenzie and agrees with plan that has been outlined.     Thank you for your consult. I will follow-up with patient. Please contact us if you have any additional questions.    JUAN CARLOS Manuel  Cardiology   [facility] [place]

## 2020-01-06 NOTE — SUBJECTIVE & OBJECTIVE
Interval History:  No acute events overnight.   Currently resting comfortably in bed in no acute distress with  visiting at bedside.    Patient converted to AFib last night, and remains in AFib this morning.  Patient is uncertain of any prior history of AFib.  She is on Eliquis 5 mg p.o. B.i.d. for history of PE, which will be continued.   ECHO pending.  Cardiology consult pending for additional recommendations.  Patient continues to wheeze, but reports improvement in symptoms.  Will taper steroids today.  Viral panel pending.  BC show NGTD.    Review of Systems   Constitutional: Positive for activity change and fatigue. Negative for chills, diaphoresis and fever.   HENT: Positive for congestion and postnasal drip. Negative for facial swelling, hearing loss, mouth sores, sneezing, sore throat, tinnitus and trouble swallowing.    Eyes: Negative for photophobia, pain, discharge, redness and visual disturbance.   Respiratory: Positive for cough, shortness of breath and wheezing. Negative for apnea, choking, chest tightness and stridor.         Productive cough with gray sputum.   Cardiovascular: Negative for chest pain, palpitations and leg swelling.   Gastrointestinal: Negative for abdominal distention, abdominal pain, anal bleeding, blood in stool, constipation, diarrhea, nausea, rectal pain and vomiting.   Endocrine: Negative for cold intolerance, heat intolerance, polydipsia, polyphagia and polyuria.   Genitourinary: Negative for difficulty urinating, dysuria, flank pain, frequency, hematuria, pelvic pain, urgency, vaginal bleeding, vaginal discharge and vaginal pain.   Musculoskeletal: Positive for arthralgias. Negative for back pain, gait problem, myalgias and neck stiffness.   Skin: Negative for pallor, rash and wound.   Allergic/Immunologic: Negative for food allergies.   Neurological: Negative for dizziness, tremors, seizures, syncope, speech difficulty, weakness and headaches.   Hematological: Negative  for adenopathy. Does not bruise/bleed easily.   Psychiatric/Behavioral: Negative for behavioral problems and confusion. The patient is not nervous/anxious.    All other systems reviewed and are negative.    Objective:     Vital Signs (Most Recent):  Temp: 97.7 °F (36.5 °C) (01/06/20 0655)  Pulse: 103 (01/06/20 0955)  Resp: 20 (01/06/20 0757)  BP: (!) 140/69 (01/06/20 0655)  SpO2: (!) 94 % (01/06/20 0757) Vital Signs (24h Range):  Temp:  [97.2 °F (36.2 °C)-98 °F (36.7 °C)] 97.7 °F (36.5 °C)  Pulse:  [] 103  Resp:  [16-20] 20  SpO2:  [92 %-96 %] 94 %  BP: (134-147)/(62-93) 140/69     Weight: 99.4 kg (219 lb 2.2 oz)  Body mass index is 34.32 kg/m².    Intake/Output Summary (Last 24 hours) at 1/6/2020 1039  Last data filed at 1/6/2020 0600  Gross per 24 hour   Intake 1228 ml   Output --   Net 1228 ml      Physical Exam   Constitutional: She is oriented to person, place, and time. She appears well-developed and well-nourished.   Morbidly obese CF resting comfortably in bed in NAD.   HENT:   Head: Normocephalic and atraumatic.   Mouth/Throat: Oropharynx is clear and moist.   Eyes: Pupils are equal, round, and reactive to light. Conjunctivae and EOM are normal.   Neck: Normal range of motion. Neck supple.   Cardiovascular: Normal rate, regular rhythm, normal heart sounds and intact distal pulses. Exam reveals no gallop and no friction rub.   No murmur heard.  Pulmonary/Chest: Effort normal. No respiratory distress. She has wheezes. She has no rales. She exhibits no tenderness.   Conversational dyspnea is improving; Coarse BBS with scattered wheezing throughout; comfortable on 2L NC.   Abdominal: Soft. Bowel sounds are normal. She exhibits no distension and no mass. There is no tenderness.   Musculoskeletal: Normal range of motion. She exhibits no edema or tenderness.   Neurological: She is alert and oriented to person, place, and time.   Skin: Skin is warm and dry. No rash noted. No erythema.   Psychiatric: She has a  normal mood and affect. Her behavior is normal. Judgment and thought content normal.   Nursing note and vitals reviewed.      Significant Labs:   Blood Culture:   Recent Labs   Lab 01/04/20  1055 01/04/20  1109   LABBLOO No Growth to date  No Growth to date No Growth to date  No Growth to date     BMP:   Recent Labs   Lab 01/06/20  0320   *      K 4.4   CL 98   CO2 30*   BUN 18   CREATININE 0.7   CALCIUM 9.7     CBC:   Recent Labs   Lab 01/05/20  0406 01/06/20  0320   WBC 9.89 13.07*   HGB 12.8 13.0   HCT 40.2 40.9    225       Significant Imaging: I have reviewed all pertinent imaging results/findings within the past 24 hours.

## 2020-01-06 NOTE — PLAN OF CARE
Patient AAOx4. VSS..   Patient remained afebrile throughout the shift.  Heart rate closely monitored   Patient afib on monitor.  Patient remained free of falls this shift.  Plan of care reviewed.  Patient verbalized understanding.  Patient moving/turning independently  Frequent weight shifting encouraged.  Bed low, siderails up x2, wheels locked, call light in reach.  Patient instructed to call for assistance.  Hourly rounding completed.  Will continue to monitor.

## 2020-01-06 NOTE — SUBJECTIVE & OBJECTIVE
Past Medical History:   Diagnosis Date    Abdominal pain 1/4/2020    Hypertension     Irregular heart rhythm     Mitral valve prolapse     Pulmonary embolus        Past Surgical History:   Procedure Laterality Date    BACK SURGERY      EYE SURGERY      HYSTERECTOMY      TOTAL KNEE ARTHROPLASTY         Review of patient's allergies indicates:   Allergen Reactions    Pcn [penicillins]     Sulfa (sulfonamide antibiotics)        No current facility-administered medications on file prior to encounter.      Current Outpatient Medications on File Prior to Encounter   Medication Sig    alprazolam (XANAX) 0.5 MG tablet Take 0.5 mg by mouth nightly as needed.     apixaban (ELIQUIS) 5 mg Tab Take by mouth 2 (two) times daily.     ergocalciferol (ERGOCALCIFEROL) 50,000 unit Cap Take 50,000 Units by mouth every 7 days.    hydrochlorothiazide (HYDRODIURIL) 25 MG tablet Take 25 mg by mouth once daily.    hydrocodone-acetaminophen 5-325mg (NORCO) 5-325 mg per tablet Take 1 tablet by mouth every 6 (six) hours as needed for Pain.    levoFLOXacin (LEVAQUIN) 500 MG tablet Take 500 mg by mouth once daily.    levothyroxine (SYNTHROID) 50 MCG tablet Take 50 mcg by mouth before breakfast.     linaCLOtide (LINZESS) 290 mcg Cap capsule as needed.     lisinopril (PRINIVIL,ZESTRIL) 20 MG tablet Take 20 mg by mouth once daily.    omeprazole (PRILOSEC) 20 MG capsule Take 20 mg by mouth once daily.     Family History     None        Tobacco Use    Smoking status: Never Smoker   Substance and Sexual Activity    Alcohol use: No    Drug use: No    Sexual activity: Not Currently     Review of Systems   Constitution: Positive for malaise/fatigue. Negative for diaphoresis, weight gain and weight loss.   HENT: Negative for congestion and nosebleeds.    Cardiovascular: Positive for leg swelling. Negative for chest pain, claudication, cyanosis, dyspnea on exertion, irregular heartbeat, near-syncope, orthopnea, palpitations,  paroxysmal nocturnal dyspnea and syncope.   Respiratory: Positive for cough, shortness of breath and sputum production. Negative for hemoptysis, sleep disturbances due to breathing, snoring and wheezing.    Hematologic/Lymphatic: Negative for bleeding problem. Does not bruise/bleed easily.   Skin: Negative for rash.   Musculoskeletal: Negative for arthritis, back pain, falls, joint pain, muscle cramps and muscle weakness.   Gastrointestinal: Negative for abdominal pain, constipation, diarrhea, heartburn, hematemesis, hematochezia, melena, nausea and vomiting.   Genitourinary: Negative for dysuria, hematuria and nocturia.   Neurological: Negative for excessive daytime sleepiness, dizziness, headaches, light-headedness, loss of balance, numbness, vertigo and weakness.     Objective:     Vital Signs (Most Recent):  Temp: 97.2 °F (36.2 °C) (01/06/20 1104)  Pulse: 103 (01/06/20 1104)  Resp: 20 (01/06/20 1104)  BP: 126/69 (01/06/20 1104)  SpO2: (!) 92 % (01/06/20 1104) Vital Signs (24h Range):  Temp:  [97.2 °F (36.2 °C)-98 °F (36.7 °C)] 97.2 °F (36.2 °C)  Pulse:  [] 103  Resp:  [16-20] 20  SpO2:  [92 %-96 %] 92 %  BP: (126-147)/(62-93) 126/69     Weight: 99.4 kg (219 lb 2.2 oz)  Body mass index is 34.32 kg/m².    SpO2: (!) 92 %  O2 Device (Oxygen Therapy): room air      Intake/Output Summary (Last 24 hours) at 1/6/2020 1215  Last data filed at 1/6/2020 0600  Gross per 24 hour   Intake 1228 ml   Output --   Net 1228 ml       Lines/Drains/Airways     Peripheral Intravenous Line                 Peripheral IV - Single Lumen 01/04/20 1453 20 G Left Antecubital 1 day         Peripheral IV - Single Lumen 01/05/20 1343 20 G Left;Posterior Forearm less than 1 day                Physical Exam   Constitutional: She is oriented to person, place, and time. She appears well-developed and well-nourished.   Neck: Neck supple. No JVD present.   Cardiovascular: Normal rate, normal heart sounds and normal pulses. An irregularly  irregular rhythm present. Exam reveals no friction rub.   No murmur heard.  Pulmonary/Chest: Effort normal. No respiratory distress. She has decreased breath sounds. She has no wheezes. She has rhonchi. She has no rales.   Abdominal: Soft. Bowel sounds are normal. She exhibits no distension.   Musculoskeletal: She exhibits edema ( trace bilaterally ). She exhibits no tenderness.   Neurological: She is alert and oriented to person, place, and time.   Skin: Skin is warm and dry. No rash noted.   Psychiatric: She has a normal mood and affect. Her behavior is normal.   Nursing note and vitals reviewed.      Significant Labs:   All pertinent lab results from the last 24 hours have been reviewed. and   Recent Lab Results       01/06/20  1157   01/06/20  0320        Anion Gap   14     Baso #   0.03     Basophil%   0.2     BUN, Bld   18     Calcium   9.7     Chloride   98     CO2   30     Creatinine   0.7     Differential Method   Automated     eGFR if    >60     eGFR if non    >60  Comment:  Calculation used to obtain the estimated glomerular filtration  rate (eGFR) is the CKD-EPI equation.        Eos #   0.0     Eosinophil%   0.0     Glucose   241     Gran # (ANC)   11.2     Gran%   85.7     Hematocrit   40.9     Hemoglobin   13.0     Immature Grans (Abs)   0.16  Comment:  Mild elevation in immature granulocytes is non specific and   can be seen in a variety of conditions including stress response,   acute inflammation, trauma and pregnancy. Correlation with other   laboratory and clinical findings is essential.       Immature Granulocytes   1.2     Lymph #   1.1     Lymph%   8.7     MCH   28.6     MCHC   31.8     MCV   90     Mono #   0.6     Mono%   4.2     MPV   10.1     nRBC   0     Platelets   225     POCT Glucose 241       Potassium   4.4     RBC   4.54     RDW   12.1     Sodium   142     WBC   13.07           Significant Imaging: Echocardiogram:   2D echo with color flow doppler:    Results for orders placed or performed during the hospital encounter of 01/04/20   2D echo with color flow doppler    Narrative    This study is in progress....

## 2020-01-06 NOTE — ASSESSMENT & PLAN NOTE
- Likely secondary to infectious bronchiolitis  - Lactic acid normal  - BC show NGTD  - Continue IV ABX  - Currently hemodynamically stable

## 2020-01-06 NOTE — PROGRESS NOTES
[facility] Hospital Medicine  Progress Note    Patient Name: Aurea Harper  MRN: [id number]  Patient Class: IP- Inpatient   Admission Date: 1/4/2020  Length of Stay: 2 days  Attending Physician: Gt Deleon, *  Primary Care Provider: Primary Doctor No        Subjective:     Principal Problem:Acute hypoxemic respiratory failure        HPI:  Aurea Harper is a 87 y.o. female patient with a PMHx of HTN, and pulmonary embolus 8 years ago on long term OAC with Eliquis, who presented to the Emergency Department today for evaluation of abdominal pain and vomiting, which onset gradually 2-3 weeks PTA.  Patient reports that she has been feeling sick for the past few weeks and received a steroid shot 2 days ago, but is still experiencing sxs.  She has also been on Levaquin.  Symptoms are episodic and moderate in severity.  No mitigating or exacerbating factors reported.  Associated sxs include SOB, chills, subjective fever, nasal and chest congestion, sore throat, productive cough, body aches, and fatigue.  Patient denies any CP, HA, syncope, diaphoresis, diarrhea, dysuria, flank pain, dizziness, light-headedness, numbness, seizures, and all other sxs at this time.  No further complaints or concerns at this time.  ER workup showed:  Hypoxia with an SaO2 of 85% on RA, and mild tachycardia, otherwise stable VS.  CBC showed WBCs elevated to 12.8k, otherwise unremarkable.  CMP unremarkable.  Troponin negative.  Lactic acid and Procalcitonin both normal.  UA negative.  ABG showed ph 7.00, Pco2 44.9, Po2 59, HCo3 27.8 on RA.  CXR negative for acute process.  CT head showed no CT evidence of an acute intracranial process. Age-appropriate atrophy with mild deep white matter microangiopathy.  Patchy ethmoid mucosal thickening.  Abdominal Xray showed nonobstructive bowel gas pattern.  CT chest showed multifocal infectious bronchiolitis.  No effusions.  BC were drawn and patient was given Rocephin and  Azithromycin in the ER and Hospital Medicine called for admission.  Influenza pending.  She will be admitted to Tele.  Patient is a Full Code.  Her SDM is her  Enrike who can be reached at [phone].    Overview/Hospital Course:  On 1/4 patient was admitted to the telemetry unit secondary to Sepsis and Acute hypoxemic respiratory failure thought to be related to infectious bronchiolitis.  BC were drawn and patient was started on scheduled nebulizer treatments in addition to Solu-Medrol IVP every 6 hr, IV ABX, and supplemental oxygen.      As of 1/5 Patient reports improvement in shortness of breath but continues to have wheezing.  BC show NGTD.  Sputum Cx shows normal respiratory ramses.  Influenza negative.  Plan to continue IV antibiotics in addition to nebulizer treatments and IV steroids.  May need pulmonary consult pending clinical course.    As of 1/6 Patient converted to AFib last night, and remains in AFib this morning.  Patient is uncertain of any prior history of AFib.  She is on Eliquis 5 mg p.o. B.i.d. for history of PE, which will be continued.   ECHO pending.  Cardiology consult pending for additional recommendations.  Patient continues to wheeze, but reports improvement in symptoms.  Will taper steroids today.  Viral panel pending.  BC show NGTD.      Interval History:  No acute events overnight.   Currently resting comfortably in bed in no acute distress with  visiting at bedside.    Patient converted to AFib last night, and remains in AFib this morning.  Patient is uncertain of any prior history of AFib.  She is on Eliquis 5 mg p.o. B.i.d. for history of PE, which will be continued.   ECHO pending.  Cardiology consult pending for additional recommendations.  Patient continues to wheeze, but reports improvement in symptoms.  Will taper steroids today.  Viral panel pending.  BC show NGTD.    Review of Systems   Constitutional: Positive for activity change and fatigue. Negative for chills,  diaphoresis and fever.   HENT: Positive for congestion and postnasal drip. Negative for facial swelling, hearing loss, mouth sores, sneezing, sore throat, tinnitus and trouble swallowing.    Eyes: Negative for photophobia, pain, discharge, redness and visual disturbance.   Respiratory: Positive for cough, shortness of breath and wheezing. Negative for apnea, choking, chest tightness and stridor.         Productive cough with gray sputum.   Cardiovascular: Negative for chest pain, palpitations and leg swelling.   Gastrointestinal: Negative for abdominal distention, abdominal pain, anal bleeding, blood in stool, constipation, diarrhea, nausea, rectal pain and vomiting.   Endocrine: Negative for cold intolerance, heat intolerance, polydipsia, polyphagia and polyuria.   Genitourinary: Negative for difficulty urinating, dysuria, flank pain, frequency, hematuria, pelvic pain, urgency, vaginal bleeding, vaginal discharge and vaginal pain.   Musculoskeletal: Positive for arthralgias. Negative for back pain, gait problem, myalgias and neck stiffness.   Skin: Negative for pallor, rash and wound.   Allergic/Immunologic: Negative for food allergies.   Neurological: Negative for dizziness, tremors, seizures, syncope, speech difficulty, weakness and headaches.   Hematological: Negative for adenopathy. Does not bruise/bleed easily.   Psychiatric/Behavioral: Negative for behavioral problems and confusion. The patient is not nervous/anxious.    All other systems reviewed and are negative.    Objective:     Vital Signs (Most Recent):  Temp: 97.7 °F (36.5 °C) (01/06/20 0655)  Pulse: 103 (01/06/20 0955)  Resp: 20 (01/06/20 0757)  BP: (!) 140/69 (01/06/20 0655)  SpO2: (!) 94 % (01/06/20 0757) Vital Signs (24h Range):  Temp:  [97.2 °F (36.2 °C)-98 °F (36.7 °C)] 97.7 °F (36.5 °C)  Pulse:  [] 103  Resp:  [16-20] 20  SpO2:  [92 %-96 %] 94 %  BP: (134-147)/(62-93) 140/69     Weight: 99.4 kg (219 lb 2.2 oz)  Body mass index is 34.32  kg/m².    Intake/Output Summary (Last 24 hours) at 1/6/2020 1039  Last data filed at 1/6/2020 0600  Gross per 24 hour   Intake 1228 ml   Output --   Net 1228 ml      Physical Exam   Constitutional: She is oriented to person, place, and time. She appears well-developed and well-nourished.   Morbidly obese CF resting comfortably in bed in NAD.   HENT:   Head: Normocephalic and atraumatic.   Mouth/Throat: Oropharynx is clear and moist.   Eyes: Pupils are equal, round, and reactive to light. Conjunctivae and EOM are normal.   Neck: Normal range of motion. Neck supple.   Cardiovascular: Normal rate, regular rhythm, normal heart sounds and intact distal pulses. Exam reveals no gallop and no friction rub.   No murmur heard.  Pulmonary/Chest: Effort normal. No respiratory distress. She has wheezes. She has no rales. She exhibits no tenderness.   Conversational dyspnea is improving; Coarse BBS with scattered wheezing throughout; comfortable on 2L NC.   Abdominal: Soft. Bowel sounds are normal. She exhibits no distension and no mass. There is no tenderness.   Musculoskeletal: Normal range of motion. She exhibits no edema or tenderness.   Neurological: She is alert and oriented to person, place, and time.   Skin: Skin is warm and dry. No rash noted. No erythema.   Psychiatric: She has a normal mood and affect. Her behavior is normal. Judgment and thought content normal.   Nursing note and vitals reviewed.      Significant Labs:   Blood Culture:   Recent Labs   Lab 01/04/20  1055 01/04/20  1109   LABBLOO No Growth to date  No Growth to date No Growth to date  No Growth to date     BMP:   Recent Labs   Lab 01/06/20  0320   *      K 4.4   CL 98   CO2 30*   BUN 18   CREATININE 0.7   CALCIUM 9.7     CBC:   Recent Labs   Lab 01/05/20  0406 01/06/20  0320   WBC 9.89 13.07*   HGB 12.8 13.0   HCT 40.2 40.9    225       Significant Imaging: I have reviewed all pertinent imaging results/findings within the past 24  hours.      Assessment/Plan:      * Acute hypoxemic respiratory failure  - Admitted to Tele  - Likely secondary to infectious bronchiolitis  - Influenza negative  - Continue supplemental O2  - Continue IV ABX and Solumedrol  - Scheduled duonebs  - Will need home O2 eval prior to DC      Bronchiolitis, acute  - BC show NGTD  - Sputum cx shows normal respiratory ramses  - Viral panel pending  - Continue IV ABX  - Change Solumedrol to 40 mg IVP q 8 hours  - Supplemental O2  - Acapella  - Scheduled Duonebs  - May need pulmonary consult pending clinical course  - Monitor      Sepsis  - Likely secondary to infectious bronchiolitis  - Lactic acid normal  - BC show NGTD  - Continue IV ABX  - Currently hemodynamically stable      Atrial fibrillation  - New onset  - HR around 115-120  - 12 lead EKG today showed Afib with RVR  - Would hold off on initiating beta-blocker due to wheezing  - Continue home Eliquis for CVA prophylaxis  - Cardiology consult pending for additional recommendations  - Tele monitoring      HTN (hypertension)  - BP under fair control  - Continue home meds  - monitor and adjust as needed      Hypothyroidism  - TSH normal  - Continue home Synthroid     History of pulmonary embolism  - Onset approximately 8 years ago from DVT  - Compliant with home Eliquis, will continue  - Monitor      Hyperglycemia  - Ai1 pending  - Likely secondary to steroids given no history of DM  - Accu checks ACHS with SSI PRN  - Expected to improve with weaning of Solu-Medrol      VTE Risk Mitigation (From admission, onward)         Ordered     apixaban tablet 5 mg  2 times daily      01/04/20 1455     IP VTE HIGH RISK PATIENT  Once      01/04/20 1154     Place sequential compression device  Until discontinued      01/04/20 1154                      Cherelle Sierra, LATANYA, ACNP-BC  Department of Hospital Medicine   [facility]

## 2020-01-06 NOTE — PLAN OF CARE
CM met with patient/ at the bedside to discuss the discharge plan.  Plan remains home with [facility], choice form previously obtained.  Patient has no other discharge needs at this time.     01/06/20 0959   Discharge Reassessment   Assessment Type Discharge Planning Reassessment   Provided patient/caregiver education on the expected discharge date and the discharge plan Yes   Do you have any problems affording any of your prescribed medications? No   Discharge Plan A Home with family;Home Health   DME Needed Upon Discharge  none   Patient choice form signed by patient/caregiver N/A   Anticipated Discharge Disposition Home-Health   Can the patient answer the patient profile reliably? Yes, cognitively intact

## 2020-01-07 LAB
ANION GAP SERPL CALC-SCNC: 13 MMOL/L (ref 8–16)
BASOPHILS # BLD AUTO: 0.03 K/UL (ref 0–0.2)
BASOPHILS NFR BLD: 0.2 % (ref 0–1.9)
BUN SERPL-MCNC: 20 MG/DL (ref 8–23)
CALCIUM SERPL-MCNC: 9.6 MG/DL (ref 8.7–10.5)
CHLORIDE SERPL-SCNC: 94 MMOL/L (ref 95–110)
CO2 SERPL-SCNC: 30 MMOL/L (ref 23–29)
CREAT SERPL-MCNC: 0.8 MG/DL (ref 0.5–1.4)
DIFFERENTIAL METHOD: ABNORMAL
EOSINOPHIL # BLD AUTO: 0 K/UL (ref 0–0.5)
EOSINOPHIL NFR BLD: 0 % (ref 0–8)
ERYTHROCYTE [DISTWIDTH] IN BLOOD BY AUTOMATED COUNT: 12 % (ref 11.5–14.5)
EST. GFR  (AFRICAN AMERICAN): >60 ML/MIN/1.73 M^2
EST. GFR  (NON AFRICAN AMERICAN): >60 ML/MIN/1.73 M^2
ESTIMATED AVG GLUCOSE: 126 MG/DL (ref 68–131)
GLUCOSE SERPL-MCNC: 170 MG/DL (ref 70–110)
HBA1C MFR BLD HPLC: 6 % (ref 4–5.6)
HCT VFR BLD AUTO: 40.4 % (ref 37–48.5)
HGB BLD-MCNC: 13.1 G/DL (ref 12–16)
IMM GRANULOCYTES # BLD AUTO: 0.25 K/UL (ref 0–0.04)
IMM GRANULOCYTES NFR BLD AUTO: 1.8 % (ref 0–0.5)
LYMPHOCYTES # BLD AUTO: 1.6 K/UL (ref 1–4.8)
LYMPHOCYTES NFR BLD: 11.3 % (ref 18–48)
MCH RBC QN AUTO: 28.9 PG (ref 27–31)
MCHC RBC AUTO-ENTMCNC: 32.4 G/DL (ref 32–36)
MCV RBC AUTO: 89 FL (ref 82–98)
MONOCYTES # BLD AUTO: 0.6 K/UL (ref 0.3–1)
MONOCYTES NFR BLD: 4.3 % (ref 4–15)
NEUTROPHILS # BLD AUTO: 11.4 K/UL (ref 1.8–7.7)
NEUTROPHILS NFR BLD: 82.4 % (ref 38–73)
NRBC BLD-RTO: 0 /100 WBC
PLATELET # BLD AUTO: 252 K/UL (ref 150–350)
PMV BLD AUTO: 10.4 FL (ref 9.2–12.9)
POCT GLUCOSE: 173 MG/DL (ref 70–110)
POCT GLUCOSE: 181 MG/DL (ref 70–110)
POCT GLUCOSE: 223 MG/DL (ref 70–110)
POCT GLUCOSE: 307 MG/DL (ref 70–110)
POTASSIUM SERPL-SCNC: 4 MMOL/L (ref 3.5–5.1)
RBC # BLD AUTO: 4.54 M/UL (ref 4–5.4)
SODIUM SERPL-SCNC: 137 MMOL/L (ref 136–145)
WBC # BLD AUTO: 13.84 K/UL (ref 3.9–12.7)

## 2020-01-07 PROCEDURE — 21400001 HC TELEMETRY ROOM

## 2020-01-07 PROCEDURE — 94664 DEMO&/EVAL PT USE INHALER: CPT

## 2020-01-07 PROCEDURE — 94640 AIRWAY INHALATION TREATMENT: CPT

## 2020-01-07 PROCEDURE — 99900035 HC TECH TIME PER 15 MIN (STAT)

## 2020-01-07 PROCEDURE — 25000003 PHARM REV CODE 250: Performed by: NURSE PRACTITIONER

## 2020-01-07 PROCEDURE — 85025 COMPLETE CBC W/AUTO DIFF WBC: CPT

## 2020-01-07 PROCEDURE — 25000003 PHARM REV CODE 250: Performed by: INTERNAL MEDICINE

## 2020-01-07 PROCEDURE — 63600175 PHARM REV CODE 636 W HCPCS: Performed by: NURSE PRACTITIONER

## 2020-01-07 PROCEDURE — 25000242 PHARM REV CODE 250 ALT 637 W/ HCPCS: Performed by: NURSE PRACTITIONER

## 2020-01-07 PROCEDURE — 36415 COLL VENOUS BLD VENIPUNCTURE: CPT

## 2020-01-07 PROCEDURE — 80048 BASIC METABOLIC PNL TOTAL CA: CPT

## 2020-01-07 RX ADMIN — IPRATROPIUM BROMIDE AND ALBUTEROL SULFATE 3 ML: .5; 3 SOLUTION RESPIRATORY (INHALATION) at 04:01

## 2020-01-07 RX ADMIN — IPRATROPIUM BROMIDE AND ALBUTEROL SULFATE 3 ML: .5; 3 SOLUTION RESPIRATORY (INHALATION) at 11:01

## 2020-01-07 RX ADMIN — HYDROCHLOROTHIAZIDE 25 MG: 25 TABLET ORAL at 09:01

## 2020-01-07 RX ADMIN — IPRATROPIUM BROMIDE AND ALBUTEROL SULFATE 3 ML: .5; 3 SOLUTION RESPIRATORY (INHALATION) at 07:01

## 2020-01-07 RX ADMIN — LEVOTHYROXINE SODIUM 50 MCG: 50 TABLET ORAL at 05:01

## 2020-01-07 RX ADMIN — GUAIFENESIN 600 MG: 600 TABLET, EXTENDED RELEASE ORAL at 09:01

## 2020-01-07 RX ADMIN — INSULIN ASPART 2 UNITS: 100 INJECTION, SOLUTION INTRAVENOUS; SUBCUTANEOUS at 04:01

## 2020-01-07 RX ADMIN — LISINOPRIL 20 MG: 20 TABLET ORAL at 09:01

## 2020-01-07 RX ADMIN — GUAIFENESIN 600 MG: 600 TABLET, EXTENDED RELEASE ORAL at 08:01

## 2020-01-07 RX ADMIN — METHYLPREDNISOLONE SODIUM SUCCINATE 40 MG: 40 INJECTION, POWDER, FOR SOLUTION INTRAMUSCULAR; INTRAVENOUS at 05:01

## 2020-01-07 RX ADMIN — METHYLPREDNISOLONE SODIUM SUCCINATE 40 MG: 40 INJECTION, POWDER, FOR SOLUTION INTRAMUSCULAR; INTRAVENOUS at 02:01

## 2020-01-07 RX ADMIN — PANTOPRAZOLE SODIUM 40 MG: 40 TABLET, DELAYED RELEASE ORAL at 09:01

## 2020-01-07 RX ADMIN — APIXABAN 5 MG: 2.5 TABLET, FILM COATED ORAL at 09:01

## 2020-01-07 RX ADMIN — INSULIN ASPART 4 UNITS: 100 INJECTION, SOLUTION INTRAVENOUS; SUBCUTANEOUS at 11:01

## 2020-01-07 RX ADMIN — POLYETHYLENE GLYCOL (3350) 17 G: 17 POWDER, FOR SOLUTION ORAL at 09:01

## 2020-01-07 RX ADMIN — AZITHROMYCIN MONOHYDRATE 500 MG: 500 INJECTION, POWDER, LYOPHILIZED, FOR SOLUTION INTRAVENOUS at 10:01

## 2020-01-07 RX ADMIN — METHYLPREDNISOLONE SODIUM SUCCINATE 40 MG: 40 INJECTION, POWDER, FOR SOLUTION INTRAMUSCULAR; INTRAVENOUS at 10:01

## 2020-01-07 RX ADMIN — IPRATROPIUM BROMIDE AND ALBUTEROL SULFATE 3 ML: .5; 3 SOLUTION RESPIRATORY (INHALATION) at 08:01

## 2020-01-07 RX ADMIN — CEFTRIAXONE 1 G: 1 INJECTION, SOLUTION INTRAVENOUS at 09:01

## 2020-01-07 RX ADMIN — APIXABAN 5 MG: 2.5 TABLET, FILM COATED ORAL at 08:01

## 2020-01-07 RX ADMIN — METOPROLOL SUCCINATE 25 MG: 25 TABLET, EXTENDED RELEASE ORAL at 09:01

## 2020-01-07 RX ADMIN — MELATONIN 6 MG: at 09:01

## 2020-01-07 NOTE — PLAN OF CARE
Home Oxygen Evaluation    Date Performed: 2020    1) Patient's Home O2 Sat on room air, while at rest: 94%        If O2 sats on room air at rest are 88% or below, patient qualifies. No additional testing needed. Document N/A in steps 2 and 3. If 89% or above, complete steps 2.      2) Patient's O2 Sat on room air while exercisin%        If O2 sats on room air while exercising remain 89% or above patient does not qualify, no further testing needed Document N/A in step 3. If O2 sats on room air while exercising are 88% or below, continue to step 3.      3) Patient's O2 Sat while exercising on O2: 97% at 1 LPM         (Must show improvement from #2 for patients to qualify)    If O2 sats improve on oxygen, patient qualifies for portable oxygen. If not, the patient does not qualify.

## 2020-01-07 NOTE — PLAN OF CARE
Patient AAOx4. VSS.  Patient remained afebrile throughout the shift.  Heart rate closely monitored   Patient afib on monitor.  Patient remained free of falls this shift.  Plan of care reviewed.  Patient verbalized understanding.  Patient moving/turning independently  Frequent weight shifting encouraged.  Bed low, siderails up x2, wheels locked, call light in reach.  Patient instructed to call for assistance.  Hourly rounding completed.  Will continue to monitor.

## 2020-01-07 NOTE — ASSESSMENT & PLAN NOTE
- New onset  - HR now rate controlled  - Cardiology consulted and started on Metoprolol  - Continue home Eliquis for CVA prophylaxis  - Tele monitoring

## 2020-01-07 NOTE — SUBJECTIVE & OBJECTIVE
"Interval History:  No acute events overnight.  Currently resting comfortably in bed in no acute distress.  Ambulated in the [place] today without difficulty with no need for PT/OT consult while here.  Patient reports improvement in SOB, but continues to have audible wheezing and c/o chest "congestion".  Exertional RA SaO2 88%.  Will plan to keep overnight for continued IV steroids and anticipate DC home on oral ABX in the AM if she remains stable overnight.  Order placed to resume HH upon DC for nursing visits and PT/OT.    Review of Systems   Constitutional: Positive for activity change and fatigue. Negative for chills, diaphoresis and fever.   HENT: Positive for congestion and postnasal drip. Negative for facial swelling, hearing loss, mouth sores, sneezing, sore throat, tinnitus and trouble swallowing.    Eyes: Negative for photophobia, pain, discharge, redness and visual disturbance.   Respiratory: Positive for cough, shortness of breath and wheezing. Negative for apnea, choking, chest tightness and stridor.         Productive cough with gray sputum.   Cardiovascular: Negative for chest pain, palpitations and leg swelling.   Gastrointestinal: Negative for abdominal distention, abdominal pain, anal bleeding, blood in stool, constipation, diarrhea, nausea, rectal pain and vomiting.   Endocrine: Negative for cold intolerance, heat intolerance, polydipsia, polyphagia and polyuria.   Genitourinary: Negative for difficulty urinating, dysuria, flank pain, frequency, hematuria, pelvic pain, urgency, vaginal bleeding, vaginal discharge and vaginal pain.   Musculoskeletal: Positive for arthralgias. Negative for back pain, gait problem, myalgias and neck stiffness.   Skin: Negative for pallor, rash and wound.   Allergic/Immunologic: Negative for food allergies.   Neurological: Negative for dizziness, tremors, seizures, syncope, speech difficulty, weakness and headaches.   Hematological: Negative for adenopathy. Does not " bruise/bleed easily.   Psychiatric/Behavioral: Negative for behavioral problems and confusion. The patient is not nervous/anxious.    All other systems reviewed and are negative.    Objective:     Vital Signs (Most Recent):  Temp: 97.1 °F (36.2 °C) (01/07/20 1123)  Pulse: 77 (01/07/20 1123)  Resp: 18 (01/07/20 1123)  BP: 110/62 (01/07/20 1123)  SpO2: 96 % (01/07/20 1123) Vital Signs (24h Range):  Temp:  [96.5 °F (35.8 °C)-97.7 °F (36.5 °C)] 97.1 °F (36.2 °C)  Pulse:  [] 77  Resp:  [18-20] 18  SpO2:  [91 %-96 %] 96 %  BP: (110-147)/(57-76) 110/62     Weight: 99.6 kg (219 lb 9.3 oz)  Body mass index is 34.39 kg/m².    Intake/Output Summary (Last 24 hours) at 1/7/2020 1132  Last data filed at 1/7/2020 0512  Gross per 24 hour   Intake 1160 ml   Output --   Net 1160 ml      Physical Exam   Constitutional: She is oriented to person, place, and time. She appears well-developed and well-nourished.   Morbidly obese CF resting comfortably in bed in NAD.   HENT:   Head: Normocephalic and atraumatic.   Mouth/Throat: Oropharynx is clear and moist.   Eyes: Pupils are equal, round, and reactive to light. Conjunctivae and EOM are normal.   Neck: Normal range of motion. Neck supple.   Cardiovascular: Normal rate, regular rhythm, normal heart sounds and intact distal pulses. Exam reveals no gallop and no friction rub.   No murmur heard.  Pulmonary/Chest: Effort normal. No respiratory distress. She has wheezes. She has no rales. She exhibits no tenderness.   Conversational dyspnea is improving; Coarse BBS with scattered wheezing throughout; comfortable on 2L NC.   Abdominal: Soft. Bowel sounds are normal. She exhibits no distension and no mass. There is no tenderness.   Musculoskeletal: Normal range of motion. She exhibits no edema or tenderness.   Neurological: She is alert and oriented to person, place, and time.   Skin: Skin is warm and dry. No rash noted. No erythema.   Psychiatric: She has a normal mood and affect. Her  behavior is normal. Judgment and thought content normal.   Nursing note and vitals reviewed.      Significant Labs:   Blood Culture: No results for input(s): LABBLOO in the last 48 hours.  BMP:   Recent Labs   Lab 01/07/20  0350   *      K 4.0   CL 94*   CO2 30*   BUN 20   CREATININE 0.8   CALCIUM 9.6     CBC:   Recent Labs   Lab 01/06/20  0320 01/07/20  0350   WBC 13.07* 13.84*   HGB 13.0 13.1   HCT 40.9 40.4    252       Significant Imaging: I have reviewed all pertinent imaging results/findings within the past 24 hours.

## 2020-01-07 NOTE — ASSESSMENT & PLAN NOTE
- Admitted to Tele  - Likely secondary to infectious bronchiolitis  - Influenza negative  - Continue supplemental O2  - Continue IV ABX and Solumedrol  - Scheduled duonebs  - Will need repeat home O2 eval prior to DC

## 2020-01-07 NOTE — PLAN OF CARE
"Care plan reviewed with patient. Verbalized "I am glad, I am by myself here, got some good rest, and cleaned up today. They might send me home tomorrow." Able to get some rest periods in bed most of the day. Free from falls. Still with intermittent audible wheezes. No other complaints made.  "

## 2020-01-07 NOTE — NURSING
Patient dropped the tablets to be taken on the floor--nurse discarded the contaminated meds and pulled out new meds from [facility] as replacement.

## 2020-01-07 NOTE — PROGRESS NOTES
[facility] Hospital Medicine  Progress Note    Patient Name: Aurea Harper  MRN: [id number]  Patient Class: IP- Inpatient   Admission Date: 1/4/2020  Length of Stay: 3 days  Attending Physician: Gt Deleon, *  Primary Care Provider: Primary Doctor No        Subjective:     Principal Problem:Acute hypoxemic respiratory failure        HPI:  Aurea Harper is a 87 y.o. female patient with a PMHx of HTN, and pulmonary embolus 8 years ago on long term OAC with Eliquis, who presented to the Emergency Department today for evaluation of abdominal pain and vomiting, which onset gradually 2-3 weeks PTA.  Patient reports that she has been feeling sick for the past few weeks and received a steroid shot 2 days ago, but is still experiencing sxs.  She has also been on Levaquin.  Symptoms are episodic and moderate in severity.  No mitigating or exacerbating factors reported.  Associated sxs include SOB, chills, subjective fever, nasal and chest congestion, sore throat, productive cough, body aches, and fatigue.  Patient denies any CP, HA, syncope, diaphoresis, diarrhea, dysuria, flank pain, dizziness, light-headedness, numbness, seizures, and all other sxs at this time.  No further complaints or concerns at this time.  ER workup showed:  Hypoxia with an SaO2 of 85% on RA, and mild tachycardia, otherwise stable VS.  CBC showed WBCs elevated to 12.8k, otherwise unremarkable.  CMP unremarkable.  Troponin negative.  Lactic acid and Procalcitonin both normal.  UA negative.  ABG showed ph 7.00, Pco2 44.9, Po2 59, HCo3 27.8 on RA.  CXR negative for acute process.  CT head showed no CT evidence of an acute intracranial process. Age-appropriate atrophy with mild deep white matter microangiopathy.  Patchy ethmoid mucosal thickening.  Abdominal Xray showed nonobstructive bowel gas pattern.  CT chest showed multifocal infectious bronchiolitis.  No effusions.  BC were drawn and patient was given Rocephin and  "Azithromycin in the ER and Hospital Medicine called for admission.  Influenza pending.  She will be admitted to Tele.  Patient is a Full Code.  Her SDM is her  Enrike who can be reached at [phone].    Overview/Hospital Course:  On 1/4 patient was admitted to the telemetry unit secondary to Sepsis and Acute hypoxemic respiratory failure thought to be related to infectious bronchiolitis.  BC were drawn and patient was started on scheduled nebulizer treatments in addition to Solu-Medrol IVP every 6 hr, IV ABX, and supplemental oxygen.      As of 1/5 Patient reports improvement in shortness of breath but continues to have wheezing.  BC show NGTD.  Sputum Cx shows normal respiratory ramses.  Influenza negative.  Plan to continue IV antibiotics in addition to nebulizer treatments and IV steroids.  May need pulmonary consult pending clinical course.    As of 1/6 Patient converted to AFib last night, and remains in AFib this morning.  Patient is uncertain of any prior history of AFib.  She is on Eliquis 5 mg p.o. B.i.d. for history of PE, which will be continued.   ECHO pending.  Cardiology consult pending for additional recommendations.  Patient continues to wheeze, but reports improvement in symptoms.  Will taper steroids today.  Viral panel pending.  BC show NGTD.      As of 1/7 Patient reports improvement in SOB, but continues to have audible wheezing and c/o chest "congestion".  Exertional RA SaO2 88%.  Will plan to keep overnight for continued IV steroids and anticipate DC home on oral ABX in the AM if she remains stable overnight.  Order placed to resume HH upon DC for nursing visits and PT/OT.  Will need repeat home O2 eval prior to DC.    Interval History:  No acute events overnight.  Currently resting comfortably in bed in no acute distress.  Ambulated in the colby today without difficulty with no need for PT/OT consult while here.  Patient reports improvement in SOB, but continues to have audible wheezing and " "c/o chest "congestion".  Exertional RA SaO2 88%.  Will plan to keep overnight for continued IV steroids and anticipate DC home on oral ABX in the AM if she remains stable overnight.  Order placed to resume HH upon DC for nursing visits and PT/OT.  Will need repeat home O2 eval prior to DC.      Review of Systems   Constitutional: Positive for activity change and fatigue. Negative for chills, diaphoresis and fever.   HENT: Positive for congestion and postnasal drip. Negative for facial swelling, hearing loss, mouth sores, sneezing, sore throat, tinnitus and trouble swallowing.    Eyes: Negative for photophobia, pain, discharge, redness and visual disturbance.   Respiratory: Positive for cough, shortness of breath and wheezing. Negative for apnea, choking, chest tightness and stridor.         Productive cough with gray sputum.   Cardiovascular: Negative for chest pain, palpitations and leg swelling.   Gastrointestinal: Negative for abdominal distention, abdominal pain, anal bleeding, blood in stool, constipation, diarrhea, nausea, rectal pain and vomiting.   Endocrine: Negative for cold intolerance, heat intolerance, polydipsia, polyphagia and polyuria.   Genitourinary: Negative for difficulty urinating, dysuria, flank pain, frequency, hematuria, pelvic pain, urgency, vaginal bleeding, vaginal discharge and vaginal pain.   Musculoskeletal: Positive for arthralgias. Negative for back pain, gait problem, myalgias and neck stiffness.   Skin: Negative for pallor, rash and wound.   Allergic/Immunologic: Negative for food allergies.   Neurological: Negative for dizziness, tremors, seizures, syncope, speech difficulty, weakness and headaches.   Hematological: Negative for adenopathy. Does not bruise/bleed easily.   Psychiatric/Behavioral: Negative for behavioral problems and confusion. The patient is not nervous/anxious.    All other systems reviewed and are negative.    Objective:     Vital Signs (Most Recent):  Temp: 97.1 " °F (36.2 °C) (01/07/20 1123)  Pulse: 77 (01/07/20 1123)  Resp: 18 (01/07/20 1123)  BP: 110/62 (01/07/20 1123)  SpO2: 96 % (01/07/20 1123) Vital Signs (24h Range):  Temp:  [96.5 °F (35.8 °C)-97.7 °F (36.5 °C)] 97.1 °F (36.2 °C)  Pulse:  [] 77  Resp:  [18-20] 18  SpO2:  [91 %-96 %] 96 %  BP: (110-147)/(57-76) 110/62     Weight: 99.6 kg (219 lb 9.3 oz)  Body mass index is 34.39 kg/m².    Intake/Output Summary (Last 24 hours) at 1/7/2020 1132  Last data filed at 1/7/2020 0512  Gross per 24 hour   Intake 1160 ml   Output --   Net 1160 ml      Physical Exam   Constitutional: She is oriented to person, place, and time. She appears well-developed and well-nourished.   Morbidly obese CF resting comfortably in bed in NAD.   HENT:   Head: Normocephalic and atraumatic.   Mouth/Throat: Oropharynx is clear and moist.   Eyes: Pupils are equal, round, and reactive to light. Conjunctivae and EOM are normal.   Neck: Normal range of motion. Neck supple.   Cardiovascular: Normal rate, regular rhythm, normal heart sounds and intact distal pulses. Exam reveals no gallop and no friction rub.   No murmur heard.  Pulmonary/Chest: Effort normal. No respiratory distress. She has wheezes. She has no rales. She exhibits no tenderness.   Conversational dyspnea is improving; Coarse BBS with scattered wheezing throughout; comfortable on 2L NC.   Abdominal: Soft. Bowel sounds are normal. She exhibits no distension and no mass. There is no tenderness.   Musculoskeletal: Normal range of motion. She exhibits no edema or tenderness.   Neurological: She is alert and oriented to person, place, and time.   Skin: Skin is warm and dry. No rash noted. No erythema.   Psychiatric: She has a normal mood and affect. Her behavior is normal. Judgment and thought content normal.   Nursing note and vitals reviewed.      Significant Labs:   Blood Culture: No results for input(s): LABBLOO in the last 48 hours.  BMP:   Recent Labs   Lab 01/07/20  0350   *       K 4.0   CL 94*   CO2 30*   BUN 20   CREATININE 0.8   CALCIUM 9.6     CBC:   Recent Labs   Lab 01/06/20  0320 01/07/20  0350   WBC 13.07* 13.84*   HGB 13.0 13.1   HCT 40.9 40.4    252       Significant Imaging: I have reviewed all pertinent imaging results/findings within the past 24 hours.      Assessment/Plan:      * Acute hypoxemic respiratory failure  - Admitted to Tele  - Likely secondary to infectious bronchiolitis  - Influenza negative  - Continue supplemental O2  - Continue IV ABX and Solumedrol  - Scheduled duonebs  - Will need repeat home O2 eval prior to DC      Bronchiolitis, acute  - BC show NGTD  - Sputum cx shows normal respiratory ramses  - Viral panel pending  - Continue IV ABX  - Change Solumedrol to 40 mg IVP q 8 hours  - Supplemental O2  - Acapella  - Scheduled Duonebs  - Anticipate DC home in AM on oral ABX if she remains stable overnight  - Monitor      Sepsis  - Likely secondary to infectious bronchiolitis  - Lactic acid normal  - BC show NGTD  - Continue IV ABX  - Currently hemodynamically stable      Atrial fibrillation  - New onset  - HR now rate controlled  - Cardiology consulted and started on Metoprolol  - Continue home Eliquis for CVA prophylaxis  - Tele monitoring      HTN (hypertension)  - BP under fair control  - Continue home meds  - monitor and adjust as needed      Hypothyroidism  - TSH normal  - Continue home Synthroid     History of pulmonary embolism  - Onset approximately 8 years ago from DVT  - Compliant with home Eliquis, will continue  - Monitor      Hyperglycemia  - A1c 6.0   - Likely secondary to steroids given no history of DM  - Accu checks ACHS with SSI PRN  - Expected to improve with weaning of Solu-Medrol  - Outpatient f/u with PCP for monitoring of A1c      VTE Risk Mitigation (From admission, onward)         Ordered     apixaban tablet 5 mg  2 times daily      01/04/20 1455     IP VTE HIGH RISK PATIENT  Once      01/04/20 1154     Place sequential  compression device  Until discontinued      01/04/20 2024                      Cherelle Sierra, LATANYA, ACNP-BC  Department of Hospital Medicine   [facility]

## 2020-01-07 NOTE — ASSESSMENT & PLAN NOTE
- A1c 6.0   - Likely secondary to steroids given no history of DM  - Accu checks ACHS with SSI PRN  - Expected to improve with weaning of Solu-Medrol  - Outpatient f/u with PCP for monitoring of A1c

## 2020-01-07 NOTE — ASSESSMENT & PLAN NOTE
- BC show NGTD  - Sputum cx shows normal respiratory ramses  - Viral panel pending  - Continue IV ABX  - Change Solumedrol to 40 mg IVP q 8 hours  - Supplemental O2  - Acapella  - Scheduled Duonebs  - Anticipate DC home in AM on oral ABX if she remains stable overnight  - Monitor

## 2020-01-08 LAB
ANION GAP SERPL CALC-SCNC: 12 MMOL/L (ref 8–16)
BASOPHILS # BLD AUTO: 0.07 K/UL (ref 0–0.2)
BASOPHILS NFR BLD: 0.6 % (ref 0–1.9)
BNP SERPL-MCNC: 334 PG/ML (ref 0–99)
BUN SERPL-MCNC: 21 MG/DL (ref 8–23)
CALCIUM SERPL-MCNC: 9.8 MG/DL (ref 8.7–10.5)
CHLORIDE SERPL-SCNC: 93 MMOL/L (ref 95–110)
CO2 SERPL-SCNC: 33 MMOL/L (ref 23–29)
CREAT SERPL-MCNC: 0.8 MG/DL (ref 0.5–1.4)
DIFFERENTIAL METHOD: ABNORMAL
EOSINOPHIL # BLD AUTO: 0 K/UL (ref 0–0.5)
EOSINOPHIL NFR BLD: 0 % (ref 0–8)
ERYTHROCYTE [DISTWIDTH] IN BLOOD BY AUTOMATED COUNT: 11.9 % (ref 11.5–14.5)
EST. GFR  (AFRICAN AMERICAN): >60 ML/MIN/1.73 M^2
EST. GFR  (NON AFRICAN AMERICAN): >60 ML/MIN/1.73 M^2
GLUCOSE SERPL-MCNC: 155 MG/DL (ref 70–110)
HCT VFR BLD AUTO: 42.9 % (ref 37–48.5)
HGB BLD-MCNC: 13.9 G/DL (ref 12–16)
IMM GRANULOCYTES # BLD AUTO: 0.47 K/UL (ref 0–0.04)
IMM GRANULOCYTES NFR BLD AUTO: 3.8 % (ref 0–0.5)
LYMPHOCYTES # BLD AUTO: 1.7 K/UL (ref 1–4.8)
LYMPHOCYTES NFR BLD: 13.3 % (ref 18–48)
MCH RBC QN AUTO: 28.8 PG (ref 27–31)
MCHC RBC AUTO-ENTMCNC: 32.4 G/DL (ref 32–36)
MCV RBC AUTO: 89 FL (ref 82–98)
MONOCYTES # BLD AUTO: 0.9 K/UL (ref 0.3–1)
MONOCYTES NFR BLD: 6.8 % (ref 4–15)
NEUTROPHILS # BLD AUTO: 9.4 K/UL (ref 1.8–7.7)
NEUTROPHILS NFR BLD: 75.5 % (ref 38–73)
NRBC BLD-RTO: 0 /100 WBC
PLATELET # BLD AUTO: 277 K/UL (ref 150–350)
PMV BLD AUTO: 10.4 FL (ref 9.2–12.9)
POCT GLUCOSE: 154 MG/DL (ref 70–110)
POCT GLUCOSE: 158 MG/DL (ref 70–110)
POCT GLUCOSE: 179 MG/DL (ref 70–110)
POCT GLUCOSE: 224 MG/DL (ref 70–110)
POTASSIUM SERPL-SCNC: 4 MMOL/L (ref 3.5–5.1)
RBC # BLD AUTO: 4.82 M/UL (ref 4–5.4)
SODIUM SERPL-SCNC: 138 MMOL/L (ref 136–145)
WBC # BLD AUTO: 12.43 K/UL (ref 3.9–12.7)

## 2020-01-08 PROCEDURE — 94640 AIRWAY INHALATION TREATMENT: CPT

## 2020-01-08 PROCEDURE — 99900035 HC TECH TIME PER 15 MIN (STAT)

## 2020-01-08 PROCEDURE — 36415 COLL VENOUS BLD VENIPUNCTURE: CPT

## 2020-01-08 PROCEDURE — 25000003 PHARM REV CODE 250: Performed by: NURSE PRACTITIONER

## 2020-01-08 PROCEDURE — 63600175 PHARM REV CODE 636 W HCPCS: Performed by: NURSE PRACTITIONER

## 2020-01-08 PROCEDURE — 27000646 HC AEROBIKA DEVICE

## 2020-01-08 PROCEDURE — 85025 COMPLETE CBC W/AUTO DIFF WBC: CPT

## 2020-01-08 PROCEDURE — 83880 ASSAY OF NATRIURETIC PEPTIDE: CPT

## 2020-01-08 PROCEDURE — 94761 N-INVAS EAR/PLS OXIMETRY MLT: CPT

## 2020-01-08 PROCEDURE — 21400001 HC TELEMETRY ROOM

## 2020-01-08 PROCEDURE — 94664 DEMO&/EVAL PT USE INHALER: CPT

## 2020-01-08 PROCEDURE — 25000242 PHARM REV CODE 250 ALT 637 W/ HCPCS: Performed by: NURSE PRACTITIONER

## 2020-01-08 PROCEDURE — 27000221 HC OXYGEN, UP TO 24 HOURS

## 2020-01-08 PROCEDURE — 80048 BASIC METABOLIC PNL TOTAL CA: CPT

## 2020-01-08 PROCEDURE — 25000003 PHARM REV CODE 250: Performed by: INTERNAL MEDICINE

## 2020-01-08 PROCEDURE — 63600175 PHARM REV CODE 636 W HCPCS: Performed by: INTERNAL MEDICINE

## 2020-01-08 RX ORDER — BENZONATATE 100 MG/1
100 CAPSULE ORAL 3 TIMES DAILY PRN
Status: DISCONTINUED | OUTPATIENT
Start: 2020-01-08 | End: 2020-01-09 | Stop reason: HOSPADM

## 2020-01-08 RX ORDER — BUDESONIDE 0.5 MG/2ML
0.5 INHALANT ORAL EVERY 12 HOURS
Status: DISCONTINUED | OUTPATIENT
Start: 2020-01-08 | End: 2020-01-08

## 2020-01-08 RX ORDER — FUROSEMIDE 10 MG/ML
40 INJECTION INTRAMUSCULAR; INTRAVENOUS ONCE
Status: COMPLETED | OUTPATIENT
Start: 2020-01-08 | End: 2020-01-08

## 2020-01-08 RX ORDER — ARFORMOTEROL TARTRATE 15 UG/2ML
15 SOLUTION RESPIRATORY (INHALATION) 2 TIMES DAILY
Status: DISCONTINUED | OUTPATIENT
Start: 2020-01-08 | End: 2020-01-08

## 2020-01-08 RX ORDER — BUDESONIDE 0.5 MG/2ML
0.5 INHALANT ORAL EVERY 12 HOURS
Status: DISCONTINUED | OUTPATIENT
Start: 2020-01-08 | End: 2020-01-09 | Stop reason: HOSPADM

## 2020-01-08 RX ORDER — ARFORMOTEROL TARTRATE 15 UG/2ML
15 SOLUTION RESPIRATORY (INHALATION) 2 TIMES DAILY
Status: DISCONTINUED | OUTPATIENT
Start: 2020-01-08 | End: 2020-01-09 | Stop reason: HOSPADM

## 2020-01-08 RX ADMIN — METHYLPREDNISOLONE SODIUM SUCCINATE 40 MG: 40 INJECTION, POWDER, FOR SOLUTION INTRAMUSCULAR; INTRAVENOUS at 11:01

## 2020-01-08 RX ADMIN — POLYETHYLENE GLYCOL (3350) 17 G: 17 POWDER, FOR SOLUTION ORAL at 09:01

## 2020-01-08 RX ADMIN — CEFTRIAXONE 1 G: 1 INJECTION, SOLUTION INTRAVENOUS at 11:01

## 2020-01-08 RX ADMIN — APIXABAN 5 MG: 2.5 TABLET, FILM COATED ORAL at 09:01

## 2020-01-08 RX ADMIN — METHYLPREDNISOLONE SODIUM SUCCINATE 40 MG: 40 INJECTION, POWDER, FOR SOLUTION INTRAMUSCULAR; INTRAVENOUS at 03:01

## 2020-01-08 RX ADMIN — METHYLPREDNISOLONE SODIUM SUCCINATE 40 MG: 40 INJECTION, POWDER, FOR SOLUTION INTRAMUSCULAR; INTRAVENOUS at 06:01

## 2020-01-08 RX ADMIN — LISINOPRIL 20 MG: 20 TABLET ORAL at 09:01

## 2020-01-08 RX ADMIN — GUAIFENESIN 600 MG: 600 TABLET, EXTENDED RELEASE ORAL at 08:01

## 2020-01-08 RX ADMIN — FUROSEMIDE 40 MG: 10 INJECTION, SOLUTION INTRAMUSCULAR; INTRAVENOUS at 12:01

## 2020-01-08 RX ADMIN — HYDROCHLOROTHIAZIDE 25 MG: 25 TABLET ORAL at 09:01

## 2020-01-08 RX ADMIN — BENZONATATE 100 MG: 100 CAPSULE ORAL at 12:01

## 2020-01-08 RX ADMIN — IPRATROPIUM BROMIDE AND ALBUTEROL SULFATE 3 ML: .5; 3 SOLUTION RESPIRATORY (INHALATION) at 03:01

## 2020-01-08 RX ADMIN — PANTOPRAZOLE SODIUM 40 MG: 40 TABLET, DELAYED RELEASE ORAL at 09:01

## 2020-01-08 RX ADMIN — ARFORMOTEROL TARTRATE 15 MCG: 15 SOLUTION RESPIRATORY (INHALATION) at 08:01

## 2020-01-08 RX ADMIN — AZITHROMYCIN MONOHYDRATE 500 MG: 500 INJECTION, POWDER, LYOPHILIZED, FOR SOLUTION INTRAVENOUS at 12:01

## 2020-01-08 RX ADMIN — LEVOTHYROXINE SODIUM 50 MCG: 50 TABLET ORAL at 05:01

## 2020-01-08 RX ADMIN — BUDESONIDE 0.5 MG: 0.5 SUSPENSION RESPIRATORY (INHALATION) at 08:01

## 2020-01-08 RX ADMIN — MELATONIN 6 MG: at 11:01

## 2020-01-08 RX ADMIN — GUAIFENESIN 600 MG: 600 TABLET, EXTENDED RELEASE ORAL at 09:01

## 2020-01-08 RX ADMIN — INSULIN ASPART 2 UNITS: 100 INJECTION, SOLUTION INTRAVENOUS; SUBCUTANEOUS at 11:01

## 2020-01-08 RX ADMIN — APIXABAN 5 MG: 2.5 TABLET, FILM COATED ORAL at 08:01

## 2020-01-08 RX ADMIN — IPRATROPIUM BROMIDE AND ALBUTEROL SULFATE 3 ML: .5; 3 SOLUTION RESPIRATORY (INHALATION) at 07:01

## 2020-01-08 RX ADMIN — IPRATROPIUM BROMIDE AND ALBUTEROL SULFATE 3 ML: .5; 3 SOLUTION RESPIRATORY (INHALATION) at 11:01

## 2020-01-08 RX ADMIN — IPRATROPIUM BROMIDE AND ALBUTEROL SULFATE 3 ML: .5; 3 SOLUTION RESPIRATORY (INHALATION) at 08:01

## 2020-01-08 RX ADMIN — METOPROLOL SUCCINATE 25 MG: 25 TABLET, EXTENDED RELEASE ORAL at 09:01

## 2020-01-08 NOTE — PLAN OF CARE
"POC reviewed with pt at bedside; she verbalized understanding.  AAOX4, VSS, afib on tele monitor.  Heart rate closely monitored.  Pt remained afebrile.  Pt has intermittent audible wheezes; pt stated "I feel my breathing has gotten a lot better, the treatments are helping"  PIV intact. No redness or swelling noted.   Pt has no c/o of pain this shift.  Pt free from falls this shift; fall precautions in place.  Pt free from skin breakdown this shift; able to turn self independently.   Pt currently resting comfortably in bed.   Pt has no complaints at this time.  Hourly rounding complete.   Will continue to monitor.   "

## 2020-01-08 NOTE — SUBJECTIVE & OBJECTIVE
Interval History: Still on 2 liters O2. ECHO normal. Repeat O2 eval worse today (86% ambulating). Will add budesonide and Formoteral nebulizers. Will give IV Lasix 40 mg x 1 and reevaluate in am. Insurance won't pay for Home O2 with current diagnosis.       Review of Systems   Constitutional: Positive for activity change and fatigue. Negative for chills, diaphoresis and fever.   HENT: Positive for congestion and postnasal drip. Negative for facial swelling, hearing loss, mouth sores, sneezing, sore throat, tinnitus and trouble swallowing.    Eyes: Negative for photophobia, pain, discharge, redness and visual disturbance.   Respiratory: Positive for cough, shortness of breath and wheezing. Negative for apnea, choking, chest tightness and stridor.         Productive cough with gray sputum.   Cardiovascular: Negative for chest pain, palpitations and leg swelling.   Gastrointestinal: Negative for abdominal distention, abdominal pain, anal bleeding, blood in stool, constipation, diarrhea, nausea, rectal pain and vomiting.   Endocrine: Negative for cold intolerance, heat intolerance, polydipsia, polyphagia and polyuria.   Genitourinary: Negative for difficulty urinating, dysuria, flank pain, frequency, hematuria, pelvic pain, urgency, vaginal bleeding, vaginal discharge and vaginal pain.   Musculoskeletal: Positive for arthralgias. Negative for back pain, gait problem, myalgias and neck stiffness.   Skin: Negative for pallor, rash and wound.   Allergic/Immunologic: Negative for food allergies.   Neurological: Negative for dizziness, tremors, seizures, syncope, speech difficulty, weakness and headaches.   Hematological: Negative for adenopathy. Does not bruise/bleed easily.   Psychiatric/Behavioral: Negative for behavioral problems and confusion. The patient is not nervous/anxious.    All other systems reviewed and are negative.    Objective:     Vital Signs (Most Recent):  Temp: 97.5 °F (36.4 °C) (01/08/20 1137)  Pulse:  79 (01/08/20 1137)  Resp: 20 (01/08/20 1137)  BP: (!) 117/56 (01/08/20 1137)  SpO2: 96 % (01/08/20 1137) Vital Signs (24h Range):  Temp:  [97.3 °F (36.3 °C)-98.4 °F (36.9 °C)] 97.5 °F (36.4 °C)  Pulse:  [63-82] 79  Resp:  [15-20] 20  SpO2:  [88 %-97 %] 96 %  BP: (117-145)/(56-86) 117/56     Weight: 99.4 kg (219 lb 2.2 oz)  Body mass index is 34.32 kg/m².    Intake/Output Summary (Last 24 hours) at 1/8/2020 1506  Last data filed at 1/8/2020 0400  Gross per 24 hour   Intake 1538 ml   Output --   Net 1538 ml      Physical Exam   Constitutional: She is oriented to person, place, and time. She appears well-developed and well-nourished.   Morbidly obese CF resting comfortably in bed in NAD.   HENT:   Head: Normocephalic and atraumatic.   Mouth/Throat: Oropharynx is clear and moist.   Eyes: Pupils are equal, round, and reactive to light. Conjunctivae and EOM are normal.   Neck: Normal range of motion. Neck supple.   Cardiovascular: Normal rate, regular rhythm, normal heart sounds and intact distal pulses. Exam reveals no gallop and no friction rub.   No murmur heard.  Pulmonary/Chest: Effort normal. No respiratory distress. She has wheezes. She has no rales. She exhibits no tenderness.   Conversational dyspnea is improving; Coarse BBS with scattered wheezing throughout; comfortable on 2L NC.   Abdominal: Soft. Bowel sounds are normal. She exhibits no distension and no mass. There is no tenderness.   Musculoskeletal: Normal range of motion. She exhibits no edema or tenderness.   Neurological: She is alert and oriented to person, place, and time.   Skin: Skin is warm and dry. No rash noted. No erythema.   Psychiatric: She has a normal mood and affect. Her behavior is normal. Judgment and thought content normal.   Nursing note and vitals reviewed.    Significant Labs: All pertinent labs within the past 24 hours have been reviewed.  Results for orders placed or performed during the hospital encounter of 01/04/20   Culture,  Respiratory with Gram Stain   Result Value Ref Range    Respiratory Culture No S aureus or Pseudomonas isolated.     Respiratory Culture Normal respiratory ramses     Gram Stain (Respiratory) <10 epithelial cells per low power field.     Gram Stain (Respiratory) Rare WBC's     Gram Stain (Respiratory) Many Gram positive cocci     Gram Stain (Respiratory) Rare Gram negative rods    Blood Culture #1 **CANNOT BE ORDERED STAT**   Result Value Ref Range    Blood Culture, Routine No Growth to date     Blood Culture, Routine No Growth to date     Blood Culture, Routine No Growth to date     Blood Culture, Routine No Growth to date    Blood Culture #2 **CANNOT BE ORDERED STAT**   Result Value Ref Range    Blood Culture, Routine No Growth to date     Blood Culture, Routine No Growth to date     Blood Culture, Routine No Growth to date     Blood Culture, Routine No Growth to date    Influenza A & B by Molecular   Result Value Ref Range    Influenza A, Molecular Negative Negative    Influenza B, Molecular Negative Negative    Flu A & B Source Nasal swab    CBC auto differential   Result Value Ref Range    WBC 12.83 (H) 3.90 - 12.70 K/uL    RBC 4.92 4.00 - 5.40 M/uL    Hemoglobin 14.2 12.0 - 16.0 g/dL    Hematocrit 44.2 37.0 - 48.5 %    Mean Corpuscular Volume 90 82 - 98 fL    Mean Corpuscular Hemoglobin 28.9 27.0 - 31.0 pg    Mean Corpuscular Hemoglobin Conc 32.1 32.0 - 36.0 g/dL    RDW 12.4 11.5 - 14.5 %    Platelets 246 150 - 350 K/uL    MPV 10.0 9.2 - 12.9 fL    Immature Granulocytes 0.7 (H) 0.0 - 0.5 %    Gran # (ANC) 9.5 (H) 1.8 - 7.7 K/uL    Immature Grans (Abs) 0.09 (H) 0.00 - 0.04 K/uL    Lymph # 1.9 1.0 - 4.8 K/uL    Mono # 1.3 (H) 0.3 - 1.0 K/uL    Eos # 0.0 0.0 - 0.5 K/uL    Baso # 0.03 0.00 - 0.20 K/uL    nRBC 0 0 /100 WBC    Gran% 74.0 (H) 38.0 - 73.0 %    Lymph% 15.1 (L) 18.0 - 48.0 %    Mono% 9.9 4.0 - 15.0 %    Eosinophil% 0.1 0.0 - 8.0 %    Basophil% 0.2 0.0 - 1.9 %    Differential Method Automated     Comprehensive metabolic panel   Result Value Ref Range    Sodium 137 136 - 145 mmol/L    Potassium 3.8 3.5 - 5.1 mmol/L    Chloride 100 95 - 110 mmol/L    CO2 26 23 - 29 mmol/L    Glucose 112 (H) 70 - 110 mg/dL    BUN, Bld 15 8 - 23 mg/dL    Creatinine 0.7 0.5 - 1.4 mg/dL    Calcium 9.6 8.7 - 10.5 mg/dL    Total Protein 7.2 6.0 - 8.4 g/dL    Albumin 3.6 3.5 - 5.2 g/dL    Total Bilirubin 0.7 0.1 - 1.0 mg/dL    Alkaline Phosphatase 106 55 - 135 U/L    AST 24 10 - 40 U/L    ALT 12 10 - 44 U/L    Anion Gap 11 8 - 16 mmol/L    eGFR if African American >60 >60 mL/min/1.73 m^2    eGFR if non African American >60 >60 mL/min/1.73 m^2   Troponin I #1   Result Value Ref Range    Troponin I 0.023 0.000 - 0.026 ng/mL   B-Type natriuretic peptide (BNP)   Result Value Ref Range     (H) 0 - 99 pg/mL   Protime-INR   Result Value Ref Range    Prothrombin Time 10.5 9.0 - 12.5 sec    INR 1.0 0.8 - 1.2   APTT   Result Value Ref Range    aPTT 27.1 21.0 - 32.0 sec   Urinalysis, Reflex to Urine Culture Urine, Clean Catch   Result Value Ref Range    Specimen UA Urine, Catheterized     Color, UA Yellow Yellow, Straw, Zayda    Appearance, UA Clear Clear    pH, UA 6.0 5.0 - 8.0    Specific Gravity, UA 1.025 1.005 - 1.030    Protein, UA Negative Negative    Glucose, UA Negative Negative    Ketones, UA Negative Negative    Bilirubin (UA) Negative Negative    Occult Blood UA 1+ (A) Negative    Nitrite, UA Negative Negative    Urobilinogen, UA Negative <2.0 EU/dL    Leukocytes, UA Negative Negative   Urinalysis Microscopic   Result Value Ref Range    RBC, UA 1 0 - 4 /hpf    WBC, UA 1 0 - 5 /hpf    Bacteria None None-Occ /hpf    Yeast, UA None None    Squam Epithel, UA 1 /hpf    Microscopic Comment SEE COMMENT    Lactic acid, plasma   Result Value Ref Range    Lactate (Lactic Acid) 0.8 0.5 - 2.2 mmol/L   Procalcitonin   Result Value Ref Range    Procalcitonin 0.03 <0.25 ng/mL   Lipase   Result Value Ref Range    Lipase 17 4 - 60 U/L    Basic Metabolic Panel (BMP)   Result Value Ref Range    Sodium 137 136 - 145 mmol/L    Potassium 4.0 3.5 - 5.1 mmol/L    Chloride 100 95 - 110 mmol/L    CO2 26 23 - 29 mmol/L    Glucose 180 (H) 70 - 110 mg/dL    BUN, Bld 17 8 - 23 mg/dL    Creatinine 0.7 0.5 - 1.4 mg/dL    Calcium 9.1 8.7 - 10.5 mg/dL    Anion Gap 11 8 - 16 mmol/L    eGFR if African American >60 >60 mL/min/1.73 m^2    eGFR if non African American >60 >60 mL/min/1.73 m^2   CBC with Automated Differential   Result Value Ref Range    WBC 9.89 3.90 - 12.70 K/uL    RBC 4.44 4.00 - 5.40 M/uL    Hemoglobin 12.8 12.0 - 16.0 g/dL    Hematocrit 40.2 37.0 - 48.5 %    Mean Corpuscular Volume 91 82 - 98 fL    Mean Corpuscular Hemoglobin 28.8 27.0 - 31.0 pg    Mean Corpuscular Hemoglobin Conc 31.8 (L) 32.0 - 36.0 g/dL    RDW 12.4 11.5 - 14.5 %    Platelets 208 150 - 350 K/uL    MPV 10.4 9.2 - 12.9 fL    Immature Granulocytes 1.0 (H) 0.0 - 0.5 %    Gran # (ANC) 8.8 (H) 1.8 - 7.7 K/uL    Immature Grans (Abs) 0.10 (H) 0.00 - 0.04 K/uL    Lymph # 0.9 (L) 1.0 - 4.8 K/uL    Mono # 0.1 (L) 0.3 - 1.0 K/uL    Eos # 0.0 0.0 - 0.5 K/uL    Baso # 0.02 0.00 - 0.20 K/uL    nRBC 0 0 /100 WBC    Gran% 89.4 (H) 38.0 - 73.0 %    Lymph% 8.6 (L) 18.0 - 48.0 %    Mono% 0.8 (L) 4.0 - 15.0 %    Eosinophil% 0.0 0.0 - 8.0 %    Basophil% 0.2 0.0 - 1.9 %    Differential Method Automated    TSH   Result Value Ref Range    TSH 0.742 0.400 - 4.000 uIU/mL   Basic Metabolic Panel (BMP)   Result Value Ref Range    Sodium 142 136 - 145 mmol/L    Potassium 4.4 3.5 - 5.1 mmol/L    Chloride 98 95 - 110 mmol/L    CO2 30 (H) 23 - 29 mmol/L    Glucose 241 (H) 70 - 110 mg/dL    BUN, Bld 18 8 - 23 mg/dL    Creatinine 0.7 0.5 - 1.4 mg/dL    Calcium 9.7 8.7 - 10.5 mg/dL    Anion Gap 14 8 - 16 mmol/L    eGFR if African American >60 >60 mL/min/1.73 m^2    eGFR if non African American >60 >60 mL/min/1.73 m^2   CBC with Automated Differential   Result Value Ref Range    WBC 13.07 (H) 3.90 - 12.70 K/uL    RBC  4.54 4.00 - 5.40 M/uL    Hemoglobin 13.0 12.0 - 16.0 g/dL    Hematocrit 40.9 37.0 - 48.5 %    Mean Corpuscular Volume 90 82 - 98 fL    Mean Corpuscular Hemoglobin 28.6 27.0 - 31.0 pg    Mean Corpuscular Hemoglobin Conc 31.8 (L) 32.0 - 36.0 g/dL    RDW 12.1 11.5 - 14.5 %    Platelets 225 150 - 350 K/uL    MPV 10.1 9.2 - 12.9 fL    Immature Granulocytes 1.2 (H) 0.0 - 0.5 %    Gran # (ANC) 11.2 (H) 1.8 - 7.7 K/uL    Immature Grans (Abs) 0.16 (H) 0.00 - 0.04 K/uL    Lymph # 1.1 1.0 - 4.8 K/uL    Mono # 0.6 0.3 - 1.0 K/uL    Eos # 0.0 0.0 - 0.5 K/uL    Baso # 0.03 0.00 - 0.20 K/uL    nRBC 0 0 /100 WBC    Gran% 85.7 (H) 38.0 - 73.0 %    Lymph% 8.7 (L) 18.0 - 48.0 %    Mono% 4.2 4.0 - 15.0 %    Eosinophil% 0.0 0.0 - 8.0 %    Basophil% 0.2 0.0 - 1.9 %    Differential Method Automated    Hemoglobin A1c   Result Value Ref Range    Hemoglobin A1C 6.0 (H) 4.0 - 5.6 %    Estimated Avg Glucose 126 68 - 131 mg/dL   Basic Metabolic Panel (BMP)   Result Value Ref Range    Sodium 137 136 - 145 mmol/L    Potassium 4.0 3.5 - 5.1 mmol/L    Chloride 94 (L) 95 - 110 mmol/L    CO2 30 (H) 23 - 29 mmol/L    Glucose 170 (H) 70 - 110 mg/dL    BUN, Bld 20 8 - 23 mg/dL    Creatinine 0.8 0.5 - 1.4 mg/dL    Calcium 9.6 8.7 - 10.5 mg/dL    Anion Gap 13 8 - 16 mmol/L    eGFR if African American >60 >60 mL/min/1.73 m^2    eGFR if non African American >60 >60 mL/min/1.73 m^2   CBC with Automated Differential   Result Value Ref Range    WBC 13.84 (H) 3.90 - 12.70 K/uL    RBC 4.54 4.00 - 5.40 M/uL    Hemoglobin 13.1 12.0 - 16.0 g/dL    Hematocrit 40.4 37.0 - 48.5 %    Mean Corpuscular Volume 89 82 - 98 fL    Mean Corpuscular Hemoglobin 28.9 27.0 - 31.0 pg    Mean Corpuscular Hemoglobin Conc 32.4 32.0 - 36.0 g/dL    RDW 12.0 11.5 - 14.5 %    Platelets 252 150 - 350 K/uL    MPV 10.4 9.2 - 12.9 fL    Immature Granulocytes 1.8 (H) 0.0 - 0.5 %    Gran # (ANC) 11.4 (H) 1.8 - 7.7 K/uL    Immature Grans (Abs) 0.25 (H) 0.00 - 0.04 K/uL    Lymph # 1.6 1.0 - 4.8  K/uL    Mono # 0.6 0.3 - 1.0 K/uL    Eos # 0.0 0.0 - 0.5 K/uL    Baso # 0.03 0.00 - 0.20 K/uL    nRBC 0 0 /100 WBC    Gran% 82.4 (H) 38.0 - 73.0 %    Lymph% 11.3 (L) 18.0 - 48.0 %    Mono% 4.3 4.0 - 15.0 %    Eosinophil% 0.0 0.0 - 8.0 %    Basophil% 0.2 0.0 - 1.9 %    Differential Method Automated    Basic Metabolic Panel (BMP)   Result Value Ref Range    Sodium 138 136 - 145 mmol/L    Potassium 4.0 3.5 - 5.1 mmol/L    Chloride 93 (L) 95 - 110 mmol/L    CO2 33 (H) 23 - 29 mmol/L    Glucose 155 (H) 70 - 110 mg/dL    BUN, Bld 21 8 - 23 mg/dL    Creatinine 0.8 0.5 - 1.4 mg/dL    Calcium 9.8 8.7 - 10.5 mg/dL    Anion Gap 12 8 - 16 mmol/L    eGFR if African American >60 >60 mL/min/1.73 m^2    eGFR if non African American >60 >60 mL/min/1.73 m^2   CBC with Automated Differential   Result Value Ref Range    WBC 12.43 3.90 - 12.70 K/uL    RBC 4.82 4.00 - 5.40 M/uL    Hemoglobin 13.9 12.0 - 16.0 g/dL    Hematocrit 42.9 37.0 - 48.5 %    Mean Corpuscular Volume 89 82 - 98 fL    Mean Corpuscular Hemoglobin 28.8 27.0 - 31.0 pg    Mean Corpuscular Hemoglobin Conc 32.4 32.0 - 36.0 g/dL    RDW 11.9 11.5 - 14.5 %    Platelets 277 150 - 350 K/uL    MPV 10.4 9.2 - 12.9 fL    Immature Granulocytes 3.8 (H) 0.0 - 0.5 %    Gran # (ANC) 9.4 (H) 1.8 - 7.7 K/uL    Immature Grans (Abs) 0.47 (H) 0.00 - 0.04 K/uL    Lymph # 1.7 1.0 - 4.8 K/uL    Mono # 0.9 0.3 - 1.0 K/uL    Eos # 0.0 0.0 - 0.5 K/uL    Baso # 0.07 0.00 - 0.20 K/uL    nRBC 0 0 /100 WBC    Gran% 75.5 (H) 38.0 - 73.0 %    Lymph% 13.3 (L) 18.0 - 48.0 %    Mono% 6.8 4.0 - 15.0 %    Eosinophil% 0.0 0.0 - 8.0 %    Basophil% 0.6 0.0 - 1.9 %    Differential Method Automated    Brain natriuretic peptide   Result Value Ref Range     (H) 0 - 99 pg/mL   2D echo with color flow doppler   Result Value Ref Range    QEF 60 55 - 65    Diastolic Dysfunction No    ISTAT PROCEDURE   Result Value Ref Range    POC PH 7.400 7.35 - 7.45    POC PCO2 44.9 35 - 45 mmHg    POC PO2 59 (LL) 80 - 100  mmHg    POC HCO3 27.8 24 - 28 mmol/L    POC BE 3 -2 to 2 mmol/L    POC SATURATED O2 90 (L) 95 - 100 %    Sample ARTERIAL     Site LF     Allens Test Pass     DelSys Room Air     Mode SPONT     FiO2 21    POCT glucose   Result Value Ref Range    POCT Glucose 241 (H) 70 - 110 mg/dL   POCT glucose   Result Value Ref Range    POCT Glucose 215 (H) 70 - 110 mg/dL   POCT glucose   Result Value Ref Range    POCT Glucose 174 (H) 70 - 110 mg/dL   POCT glucose   Result Value Ref Range    POCT Glucose 181 (H) 70 - 110 mg/dL   POCT glucose   Result Value Ref Range    POCT Glucose 307 (H) 70 - 110 mg/dL   POCT glucose   Result Value Ref Range    POCT Glucose 223 (H) 70 - 110 mg/dL   POCT glucose   Result Value Ref Range    POCT Glucose 173 (H) 70 - 110 mg/dL   POCT glucose   Result Value Ref Range    POCT Glucose 158 (H) 70 - 110 mg/dL   POCT glucose   Result Value Ref Range    POCT Glucose 224 (H) 70 - 110 mg/dL     Significant Imaging: I have reviewed all pertinent imaging results/findings within the past 24 hours.   Results for orders placed or performed during the hospital encounter of 01/04/20   Culture, Respiratory with Gram Stain   Result Value Ref Range    Respiratory Culture No S aureus or Pseudomonas isolated.     Respiratory Culture Normal respiratory ramses     Gram Stain (Respiratory) <10 epithelial cells per low power field.     Gram Stain (Respiratory) Rare WBC's     Gram Stain (Respiratory) Many Gram positive cocci     Gram Stain (Respiratory) Rare Gram negative rods    Blood Culture #1 **CANNOT BE ORDERED STAT**   Result Value Ref Range    Blood Culture, Routine No Growth to date     Blood Culture, Routine No Growth to date     Blood Culture, Routine No Growth to date     Blood Culture, Routine No Growth to date    Blood Culture #2 **CANNOT BE ORDERED STAT**   Result Value Ref Range    Blood Culture, Routine No Growth to date     Blood Culture, Routine No Growth to date     Blood Culture, Routine No Growth to date      Blood Culture, Routine No Growth to date    Influenza A & B by Molecular   Result Value Ref Range    Influenza A, Molecular Negative Negative    Influenza B, Molecular Negative Negative    Flu A & B Source Nasal swab    CBC auto differential   Result Value Ref Range    WBC 12.83 (H) 3.90 - 12.70 K/uL    RBC 4.92 4.00 - 5.40 M/uL    Hemoglobin 14.2 12.0 - 16.0 g/dL    Hematocrit 44.2 37.0 - 48.5 %    Mean Corpuscular Volume 90 82 - 98 fL    Mean Corpuscular Hemoglobin 28.9 27.0 - 31.0 pg    Mean Corpuscular Hemoglobin Conc 32.1 32.0 - 36.0 g/dL    RDW 12.4 11.5 - 14.5 %    Platelets 246 150 - 350 K/uL    MPV 10.0 9.2 - 12.9 fL    Immature Granulocytes 0.7 (H) 0.0 - 0.5 %    Gran # (ANC) 9.5 (H) 1.8 - 7.7 K/uL    Immature Grans (Abs) 0.09 (H) 0.00 - 0.04 K/uL    Lymph # 1.9 1.0 - 4.8 K/uL    Mono # 1.3 (H) 0.3 - 1.0 K/uL    Eos # 0.0 0.0 - 0.5 K/uL    Baso # 0.03 0.00 - 0.20 K/uL    nRBC 0 0 /100 WBC    Gran% 74.0 (H) 38.0 - 73.0 %    Lymph% 15.1 (L) 18.0 - 48.0 %    Mono% 9.9 4.0 - 15.0 %    Eosinophil% 0.1 0.0 - 8.0 %    Basophil% 0.2 0.0 - 1.9 %    Differential Method Automated    Comprehensive metabolic panel   Result Value Ref Range    Sodium 137 136 - 145 mmol/L    Potassium 3.8 3.5 - 5.1 mmol/L    Chloride 100 95 - 110 mmol/L    CO2 26 23 - 29 mmol/L    Glucose 112 (H) 70 - 110 mg/dL    BUN, Bld 15 8 - 23 mg/dL    Creatinine 0.7 0.5 - 1.4 mg/dL    Calcium 9.6 8.7 - 10.5 mg/dL    Total Protein 7.2 6.0 - 8.4 g/dL    Albumin 3.6 3.5 - 5.2 g/dL    Total Bilirubin 0.7 0.1 - 1.0 mg/dL    Alkaline Phosphatase 106 55 - 135 U/L    AST 24 10 - 40 U/L    ALT 12 10 - 44 U/L    Anion Gap 11 8 - 16 mmol/L    eGFR if African American >60 >60 mL/min/1.73 m^2    eGFR if non African American >60 >60 mL/min/1.73 m^2   Troponin I #1   Result Value Ref Range    Troponin I 0.023 0.000 - 0.026 ng/mL   B-Type natriuretic peptide (BNP)   Result Value Ref Range     (H) 0 - 99 pg/mL   Protime-INR   Result Value Ref Range     Prothrombin Time 10.5 9.0 - 12.5 sec    INR 1.0 0.8 - 1.2   APTT   Result Value Ref Range    aPTT 27.1 21.0 - 32.0 sec   Urinalysis, Reflex to Urine Culture Urine, Clean Catch   Result Value Ref Range    Specimen UA Urine, Catheterized     Color, UA Yellow Yellow, Straw, Zayda    Appearance, UA Clear Clear    pH, UA 6.0 5.0 - 8.0    Specific Gravity, UA 1.025 1.005 - 1.030    Protein, UA Negative Negative    Glucose, UA Negative Negative    Ketones, UA Negative Negative    Bilirubin (UA) Negative Negative    Occult Blood UA 1+ (A) Negative    Nitrite, UA Negative Negative    Urobilinogen, UA Negative <2.0 EU/dL    Leukocytes, UA Negative Negative   Urinalysis Microscopic   Result Value Ref Range    RBC, UA 1 0 - 4 /hpf    WBC, UA 1 0 - 5 /hpf    Bacteria None None-Occ /hpf    Yeast, UA None None    Squam Epithel, UA 1 /hpf    Microscopic Comment SEE COMMENT    Lactic acid, plasma   Result Value Ref Range    Lactate (Lactic Acid) 0.8 0.5 - 2.2 mmol/L   Procalcitonin   Result Value Ref Range    Procalcitonin 0.03 <0.25 ng/mL   Lipase   Result Value Ref Range    Lipase 17 4 - 60 U/L   Basic Metabolic Panel (BMP)   Result Value Ref Range    Sodium 137 136 - 145 mmol/L    Potassium 4.0 3.5 - 5.1 mmol/L    Chloride 100 95 - 110 mmol/L    CO2 26 23 - 29 mmol/L    Glucose 180 (H) 70 - 110 mg/dL    BUN, Bld 17 8 - 23 mg/dL    Creatinine 0.7 0.5 - 1.4 mg/dL    Calcium 9.1 8.7 - 10.5 mg/dL    Anion Gap 11 8 - 16 mmol/L    eGFR if African American >60 >60 mL/min/1.73 m^2    eGFR if non African American >60 >60 mL/min/1.73 m^2   CBC with Automated Differential   Result Value Ref Range    WBC 9.89 3.90 - 12.70 K/uL    RBC 4.44 4.00 - 5.40 M/uL    Hemoglobin 12.8 12.0 - 16.0 g/dL    Hematocrit 40.2 37.0 - 48.5 %    Mean Corpuscular Volume 91 82 - 98 fL    Mean Corpuscular Hemoglobin 28.8 27.0 - 31.0 pg    Mean Corpuscular Hemoglobin Conc 31.8 (L) 32.0 - 36.0 g/dL    RDW 12.4 11.5 - 14.5 %    Platelets 208 150 - 350 K/uL    MPV  10.4 9.2 - 12.9 fL    Immature Granulocytes 1.0 (H) 0.0 - 0.5 %    Gran # (ANC) 8.8 (H) 1.8 - 7.7 K/uL    Immature Grans (Abs) 0.10 (H) 0.00 - 0.04 K/uL    Lymph # 0.9 (L) 1.0 - 4.8 K/uL    Mono # 0.1 (L) 0.3 - 1.0 K/uL    Eos # 0.0 0.0 - 0.5 K/uL    Baso # 0.02 0.00 - 0.20 K/uL    nRBC 0 0 /100 WBC    Gran% 89.4 (H) 38.0 - 73.0 %    Lymph% 8.6 (L) 18.0 - 48.0 %    Mono% 0.8 (L) 4.0 - 15.0 %    Eosinophil% 0.0 0.0 - 8.0 %    Basophil% 0.2 0.0 - 1.9 %    Differential Method Automated    TSH   Result Value Ref Range    TSH 0.742 0.400 - 4.000 uIU/mL   Basic Metabolic Panel (BMP)   Result Value Ref Range    Sodium 142 136 - 145 mmol/L    Potassium 4.4 3.5 - 5.1 mmol/L    Chloride 98 95 - 110 mmol/L    CO2 30 (H) 23 - 29 mmol/L    Glucose 241 (H) 70 - 110 mg/dL    BUN, Bld 18 8 - 23 mg/dL    Creatinine 0.7 0.5 - 1.4 mg/dL    Calcium 9.7 8.7 - 10.5 mg/dL    Anion Gap 14 8 - 16 mmol/L    eGFR if African American >60 >60 mL/min/1.73 m^2    eGFR if non African American >60 >60 mL/min/1.73 m^2   CBC with Automated Differential   Result Value Ref Range    WBC 13.07 (H) 3.90 - 12.70 K/uL    RBC 4.54 4.00 - 5.40 M/uL    Hemoglobin 13.0 12.0 - 16.0 g/dL    Hematocrit 40.9 37.0 - 48.5 %    Mean Corpuscular Volume 90 82 - 98 fL    Mean Corpuscular Hemoglobin 28.6 27.0 - 31.0 pg    Mean Corpuscular Hemoglobin Conc 31.8 (L) 32.0 - 36.0 g/dL    RDW 12.1 11.5 - 14.5 %    Platelets 225 150 - 350 K/uL    MPV 10.1 9.2 - 12.9 fL    Immature Granulocytes 1.2 (H) 0.0 - 0.5 %    Gran # (ANC) 11.2 (H) 1.8 - 7.7 K/uL    Immature Grans (Abs) 0.16 (H) 0.00 - 0.04 K/uL    Lymph # 1.1 1.0 - 4.8 K/uL    Mono # 0.6 0.3 - 1.0 K/uL    Eos # 0.0 0.0 - 0.5 K/uL    Baso # 0.03 0.00 - 0.20 K/uL    nRBC 0 0 /100 WBC    Gran% 85.7 (H) 38.0 - 73.0 %    Lymph% 8.7 (L) 18.0 - 48.0 %    Mono% 4.2 4.0 - 15.0 %    Eosinophil% 0.0 0.0 - 8.0 %    Basophil% 0.2 0.0 - 1.9 %    Differential Method Automated    Hemoglobin A1c   Result Value Ref Range    Hemoglobin A1C  6.0 (H) 4.0 - 5.6 %    Estimated Avg Glucose 126 68 - 131 mg/dL   Basic Metabolic Panel (BMP)   Result Value Ref Range    Sodium 137 136 - 145 mmol/L    Potassium 4.0 3.5 - 5.1 mmol/L    Chloride 94 (L) 95 - 110 mmol/L    CO2 30 (H) 23 - 29 mmol/L    Glucose 170 (H) 70 - 110 mg/dL    BUN, Bld 20 8 - 23 mg/dL    Creatinine 0.8 0.5 - 1.4 mg/dL    Calcium 9.6 8.7 - 10.5 mg/dL    Anion Gap 13 8 - 16 mmol/L    eGFR if African American >60 >60 mL/min/1.73 m^2    eGFR if non African American >60 >60 mL/min/1.73 m^2   CBC with Automated Differential   Result Value Ref Range    WBC 13.84 (H) 3.90 - 12.70 K/uL    RBC 4.54 4.00 - 5.40 M/uL    Hemoglobin 13.1 12.0 - 16.0 g/dL    Hematocrit 40.4 37.0 - 48.5 %    Mean Corpuscular Volume 89 82 - 98 fL    Mean Corpuscular Hemoglobin 28.9 27.0 - 31.0 pg    Mean Corpuscular Hemoglobin Conc 32.4 32.0 - 36.0 g/dL    RDW 12.0 11.5 - 14.5 %    Platelets 252 150 - 350 K/uL    MPV 10.4 9.2 - 12.9 fL    Immature Granulocytes 1.8 (H) 0.0 - 0.5 %    Gran # (ANC) 11.4 (H) 1.8 - 7.7 K/uL    Immature Grans (Abs) 0.25 (H) 0.00 - 0.04 K/uL    Lymph # 1.6 1.0 - 4.8 K/uL    Mono # 0.6 0.3 - 1.0 K/uL    Eos # 0.0 0.0 - 0.5 K/uL    Baso # 0.03 0.00 - 0.20 K/uL    nRBC 0 0 /100 WBC    Gran% 82.4 (H) 38.0 - 73.0 %    Lymph% 11.3 (L) 18.0 - 48.0 %    Mono% 4.3 4.0 - 15.0 %    Eosinophil% 0.0 0.0 - 8.0 %    Basophil% 0.2 0.0 - 1.9 %    Differential Method Automated    Basic Metabolic Panel (BMP)   Result Value Ref Range    Sodium 138 136 - 145 mmol/L    Potassium 4.0 3.5 - 5.1 mmol/L    Chloride 93 (L) 95 - 110 mmol/L    CO2 33 (H) 23 - 29 mmol/L    Glucose 155 (H) 70 - 110 mg/dL    BUN, Bld 21 8 - 23 mg/dL    Creatinine 0.8 0.5 - 1.4 mg/dL    Calcium 9.8 8.7 - 10.5 mg/dL    Anion Gap 12 8 - 16 mmol/L    eGFR if African American >60 >60 mL/min/1.73 m^2    eGFR if non African American >60 >60 mL/min/1.73 m^2   CBC with Automated Differential   Result Value Ref Range    WBC 12.43 3.90 - 12.70 K/uL    RBC  4.82 4.00 - 5.40 M/uL    Hemoglobin 13.9 12.0 - 16.0 g/dL    Hematocrit 42.9 37.0 - 48.5 %    Mean Corpuscular Volume 89 82 - 98 fL    Mean Corpuscular Hemoglobin 28.8 27.0 - 31.0 pg    Mean Corpuscular Hemoglobin Conc 32.4 32.0 - 36.0 g/dL    RDW 11.9 11.5 - 14.5 %    Platelets 277 150 - 350 K/uL    MPV 10.4 9.2 - 12.9 fL    Immature Granulocytes 3.8 (H) 0.0 - 0.5 %    Gran # (ANC) 9.4 (H) 1.8 - 7.7 K/uL    Immature Grans (Abs) 0.47 (H) 0.00 - 0.04 K/uL    Lymph # 1.7 1.0 - 4.8 K/uL    Mono # 0.9 0.3 - 1.0 K/uL    Eos # 0.0 0.0 - 0.5 K/uL    Baso # 0.07 0.00 - 0.20 K/uL    nRBC 0 0 /100 WBC    Gran% 75.5 (H) 38.0 - 73.0 %    Lymph% 13.3 (L) 18.0 - 48.0 %    Mono% 6.8 4.0 - 15.0 %    Eosinophil% 0.0 0.0 - 8.0 %    Basophil% 0.6 0.0 - 1.9 %    Differential Method Automated    Brain natriuretic peptide   Result Value Ref Range     (H) 0 - 99 pg/mL   2D echo with color flow doppler   Result Value Ref Range    QEF 60 55 - 65    Diastolic Dysfunction No    ISTAT PROCEDURE   Result Value Ref Range    POC PH 7.400 7.35 - 7.45    POC PCO2 44.9 35 - 45 mmHg    POC PO2 59 (LL) 80 - 100 mmHg    POC HCO3 27.8 24 - 28 mmol/L    POC BE 3 -2 to 2 mmol/L    POC SATURATED O2 90 (L) 95 - 100 %    Sample ARTERIAL     Site LF     Allens Test Pass     DelSys Room Air     Mode SPONT     FiO2 21    POCT glucose   Result Value Ref Range    POCT Glucose 241 (H) 70 - 110 mg/dL   POCT glucose   Result Value Ref Range    POCT Glucose 215 (H) 70 - 110 mg/dL   POCT glucose   Result Value Ref Range    POCT Glucose 174 (H) 70 - 110 mg/dL   POCT glucose   Result Value Ref Range    POCT Glucose 181 (H) 70 - 110 mg/dL   POCT glucose   Result Value Ref Range    POCT Glucose 307 (H) 70 - 110 mg/dL   POCT glucose   Result Value Ref Range    POCT Glucose 223 (H) 70 - 110 mg/dL   POCT glucose   Result Value Ref Range    POCT Glucose 173 (H) 70 - 110 mg/dL   POCT glucose   Result Value Ref Range    POCT Glucose 158 (H) 70 - 110 mg/dL   POCT glucose    Result Value Ref Range    POCT Glucose 224 (H) 70 - 110 mg/dL

## 2020-01-08 NOTE — ASSESSMENT & PLAN NOTE
- Admitted to Tele  - Likely secondary to infectious bronchiolitis  - Influenza negative  - Continue supplemental O2  - Continue IV ABX, Solumedrol  -ADD formoteral and budeside  - Scheduled duonebs  -  repeat home O2 eval: worsening hypoxia 86%  -Add one dose IV Lasix 40 mg

## 2020-01-08 NOTE — PLAN OF CARE
POC reviewed, verbalized understanding. Pt remained free from falls, fall precautions in place. Pt is afib controlled on the monitor,. VSS. No other c/o at this time. Patient has some urinary incont secondary to lasix cont abx oxygen and steriods nebs ordered cxr and bnp patient d/c was cancel due to needing o2 for discharge insurance will not pay. Pt will have to be off o2 to discharge. PIV intact. Call bell and personal belongings within reach. Hourly rounding complete. Reminded to call for assistance. Will continue to monitor.

## 2020-01-08 NOTE — PROGRESS NOTES
Home Oxygen Evaluation    Date Performed: 2020    1) Patient's Home O2 Sat on room air, while at rest: 95        If O2 sats on room air at rest are 88% or below, patient qualifies. No additional testing needed. Document N/A in steps 2 and 3. If 89% or above, complete steps 2.      2) Patient's O2 Sat on room air while exercisin%        If O2 sats on room air while exercising remain 89% or above patient does not qualify, no further testing needed Document N/A in step 3. If O2 sats on room air while exercising are 88% or below, continue to step 3.      3) Patient's O2 Sat while exercising on O2: 2 LPM 96         (Must show improvement from #2 for patients to qualify)    If O2 sats improve on oxygen, patient qualifies for portable oxygen. If not, the patient does not qualify.

## 2020-01-08 NOTE — PROGRESS NOTES
[facility] Hospital Medicine  Progress Note    Patient Name: Aurea Harper  MRN: [id number]  Patient Class: IP- Inpatient   Admission Date: 1/4/2020  Length of Stay: 4 days  Attending Physician: Gt Deleon, *  Primary Care Provider: Primary Doctor No        Subjective:     Principal Problem:Acute hypoxemic respiratory failure        HPI:  Aurea Harper is a 87 y.o. female patient with a PMHx of HTN, and pulmonary embolus 8 years ago on long term OAC with Eliquis, who presented to the Emergency Department today for evaluation of abdominal pain and vomiting, which onset gradually 2-3 weeks PTA.  Patient reports that she has been feeling sick for the past few weeks and received a steroid shot 2 days ago, but is still experiencing sxs.  She has also been on Levaquin.  Symptoms are episodic and moderate in severity.  No mitigating or exacerbating factors reported.  Associated sxs include SOB, chills, subjective fever, nasal and chest congestion, sore throat, productive cough, body aches, and fatigue.  Patient denies any CP, HA, syncope, diaphoresis, diarrhea, dysuria, flank pain, dizziness, light-headedness, numbness, seizures, and all other sxs at this time.  No further complaints or concerns at this time.  ER workup showed:  Hypoxia with an SaO2 of 85% on RA, and mild tachycardia, otherwise stable VS.  CBC showed WBCs elevated to 12.8k, otherwise unremarkable.  CMP unremarkable.  Troponin negative.  Lactic acid and Procalcitonin both normal.  UA negative.  ABG showed ph 7.00, Pco2 44.9, Po2 59, HCo3 27.8 on RA.  CXR negative for acute process.  CT head showed no CT evidence of an acute intracranial process. Age-appropriate atrophy with mild deep white matter microangiopathy.  Patchy ethmoid mucosal thickening.  Abdominal Xray showed nonobstructive bowel gas pattern.  CT chest showed multifocal infectious bronchiolitis.  No effusions.  BC were drawn and patient was given Rocephin and  "Azithromycin in the ER and Hospital Medicine called for admission.  Influenza pending.  She will be admitted to Tele.  Patient is a Full Code.  Her SDM is her  Enrike who can be reached at [phone].    Overview/Hospital Course:  On 1/4 patient was admitted to the telemetry unit secondary to Sepsis and Acute hypoxemic respiratory failure thought to be related to infectious bronchiolitis.  BC were drawn and patient was started on scheduled nebulizer treatments in addition to Solu-Medrol IVP every 6 hr, IV ABX, and supplemental oxygen.      As of 1/5 Patient reports improvement in shortness of breath but continues to have wheezing.  BC show NGTD.  Sputum Cx shows normal respiratory ramses.  Influenza negative.  Plan to continue IV antibiotics in addition to nebulizer treatments and IV steroids.  May need pulmonary consult pending clinical course.    As of 1/6 Patient converted to AFib last night, and remains in AFib this morning.  Patient is uncertain of any prior history of AFib.  She is on Eliquis 5 mg p.o. B.i.d. for history of PE, which will be continued.   ECHO pending.  Cardiology consult pending for additional recommendations.  Patient continues to wheeze, but reports improvement in symptoms.  Will taper steroids today.  Viral panel pending.  BC show NGTD.      As of 1/7 Patient reports improvement in SOB, but continues to have audible wheezing and c/o chest "congestion".  Exertional RA SaO2 88%.  Will plan to keep overnight for continued IV steroids and anticipate DC home on oral ABX in the AM if she remains stable overnight.  Order placed to resume HH upon DC for nursing visits and PT/OT.  Will need repeat home O2 eval prior to DC.    1/8/20 - Still on 2 liters O2. ECHO normal. Repeat O2 eval worse today (86% ambulating). Will add budesonide and Formoteral nebulizers. Will give IV Lasix 40 mg x 1 and reevaluate in am. Insurance won't pay for Home O2.     Interval History: Still on 2 liters O2. ECHO normal. " Repeat O2 eval worse today (86% ambulating). Will add budesonide and Formoteral nebulizers. Will give IV Lasix 40 mg x 1 and reevaluate in am. Insurance won't pay for Home O2 with current diagnosis.       Review of Systems   Constitutional: Positive for activity change and fatigue. Negative for chills, diaphoresis and fever.   HENT: Positive for congestion and postnasal drip. Negative for facial swelling, hearing loss, mouth sores, sneezing, sore throat, tinnitus and trouble swallowing.    Eyes: Negative for photophobia, pain, discharge, redness and visual disturbance.   Respiratory: Positive for cough, shortness of breath and wheezing. Negative for apnea, choking, chest tightness and stridor.         Productive cough with gray sputum.   Cardiovascular: Negative for chest pain, palpitations and leg swelling.   Gastrointestinal: Negative for abdominal distention, abdominal pain, anal bleeding, blood in stool, constipation, diarrhea, nausea, rectal pain and vomiting.   Endocrine: Negative for cold intolerance, heat intolerance, polydipsia, polyphagia and polyuria.   Genitourinary: Negative for difficulty urinating, dysuria, flank pain, frequency, hematuria, pelvic pain, urgency, vaginal bleeding, vaginal discharge and vaginal pain.   Musculoskeletal: Positive for arthralgias. Negative for back pain, gait problem, myalgias and neck stiffness.   Skin: Negative for pallor, rash and wound.   Allergic/Immunologic: Negative for food allergies.   Neurological: Negative for dizziness, tremors, seizures, syncope, speech difficulty, weakness and headaches.   Hematological: Negative for adenopathy. Does not bruise/bleed easily.   Psychiatric/Behavioral: Negative for behavioral problems and confusion. The patient is not nervous/anxious.    All other systems reviewed and are negative.    Objective:     Vital Signs (Most Recent):  Temp: 97.5 °F (36.4 °C) (01/08/20 1137)  Pulse: 79 (01/08/20 1137)  Resp: 20 (01/08/20 1137)  BP: (!)  117/56 (01/08/20 1137)  SpO2: 96 % (01/08/20 1137) Vital Signs (24h Range):  Temp:  [97.3 °F (36.3 °C)-98.4 °F (36.9 °C)] 97.5 °F (36.4 °C)  Pulse:  [63-82] 79  Resp:  [15-20] 20  SpO2:  [88 %-97 %] 96 %  BP: (117-145)/(56-86) 117/56     Weight: 99.4 kg (219 lb 2.2 oz)  Body mass index is 34.32 kg/m².    Intake/Output Summary (Last 24 hours) at 1/8/2020 1506  Last data filed at 1/8/2020 0400  Gross per 24 hour   Intake 1538 ml   Output --   Net 1538 ml      Physical Exam   Constitutional: She is oriented to person, place, and time. She appears well-developed and well-nourished.   Morbidly obese CF resting comfortably in bed in NAD.   HENT:   Head: Normocephalic and atraumatic.   Mouth/Throat: Oropharynx is clear and moist.   Eyes: Pupils are equal, round, and reactive to light. Conjunctivae and EOM are normal.   Neck: Normal range of motion. Neck supple.   Cardiovascular: Normal rate, regular rhythm, normal heart sounds and intact distal pulses. Exam reveals no gallop and no friction rub.   No murmur heard.  Pulmonary/Chest: Effort normal. No respiratory distress. She has wheezes. She has no rales. She exhibits no tenderness.   Conversational dyspnea is improving; Coarse BBS with scattered wheezing throughout; comfortable on 2L NC.   Abdominal: Soft. Bowel sounds are normal. She exhibits no distension and no mass. There is no tenderness.   Musculoskeletal: Normal range of motion. She exhibits no edema or tenderness.   Neurological: She is alert and oriented to person, place, and time.   Skin: Skin is warm and dry. No rash noted. No erythema.   Psychiatric: She has a normal mood and affect. Her behavior is normal. Judgment and thought content normal.   Nursing note and vitals reviewed.    Significant Labs: All pertinent labs within the past 24 hours have been reviewed.  Results for orders placed or performed during the hospital encounter of 01/04/20   Culture, Respiratory with Gram Stain   Result Value Ref Range     Respiratory Culture No S aureus or Pseudomonas isolated.     Respiratory Culture Normal respiratory ramses     Gram Stain (Respiratory) <10 epithelial cells per low power field.     Gram Stain (Respiratory) Rare WBC's     Gram Stain (Respiratory) Many Gram positive cocci     Gram Stain (Respiratory) Rare Gram negative rods    Blood Culture #1 **CANNOT BE ORDERED STAT**   Result Value Ref Range    Blood Culture, Routine No Growth to date     Blood Culture, Routine No Growth to date     Blood Culture, Routine No Growth to date     Blood Culture, Routine No Growth to date    Blood Culture #2 **CANNOT BE ORDERED STAT**   Result Value Ref Range    Blood Culture, Routine No Growth to date     Blood Culture, Routine No Growth to date     Blood Culture, Routine No Growth to date     Blood Culture, Routine No Growth to date    Influenza A & B by Molecular   Result Value Ref Range    Influenza A, Molecular Negative Negative    Influenza B, Molecular Negative Negative    Flu A & B Source Nasal swab    CBC auto differential   Result Value Ref Range    WBC 12.83 (H) 3.90 - 12.70 K/uL    RBC 4.92 4.00 - 5.40 M/uL    Hemoglobin 14.2 12.0 - 16.0 g/dL    Hematocrit 44.2 37.0 - 48.5 %    Mean Corpuscular Volume 90 82 - 98 fL    Mean Corpuscular Hemoglobin 28.9 27.0 - 31.0 pg    Mean Corpuscular Hemoglobin Conc 32.1 32.0 - 36.0 g/dL    RDW 12.4 11.5 - 14.5 %    Platelets 246 150 - 350 K/uL    MPV 10.0 9.2 - 12.9 fL    Immature Granulocytes 0.7 (H) 0.0 - 0.5 %    Gran # (ANC) 9.5 (H) 1.8 - 7.7 K/uL    Immature Grans (Abs) 0.09 (H) 0.00 - 0.04 K/uL    Lymph # 1.9 1.0 - 4.8 K/uL    Mono # 1.3 (H) 0.3 - 1.0 K/uL    Eos # 0.0 0.0 - 0.5 K/uL    Baso # 0.03 0.00 - 0.20 K/uL    nRBC 0 0 /100 WBC    Gran% 74.0 (H) 38.0 - 73.0 %    Lymph% 15.1 (L) 18.0 - 48.0 %    Mono% 9.9 4.0 - 15.0 %    Eosinophil% 0.1 0.0 - 8.0 %    Basophil% 0.2 0.0 - 1.9 %    Differential Method Automated    Comprehensive metabolic panel   Result Value Ref Range    Sodium  137 136 - 145 mmol/L    Potassium 3.8 3.5 - 5.1 mmol/L    Chloride 100 95 - 110 mmol/L    CO2 26 23 - 29 mmol/L    Glucose 112 (H) 70 - 110 mg/dL    BUN, Bld 15 8 - 23 mg/dL    Creatinine 0.7 0.5 - 1.4 mg/dL    Calcium 9.6 8.7 - 10.5 mg/dL    Total Protein 7.2 6.0 - 8.4 g/dL    Albumin 3.6 3.5 - 5.2 g/dL    Total Bilirubin 0.7 0.1 - 1.0 mg/dL    Alkaline Phosphatase 106 55 - 135 U/L    AST 24 10 - 40 U/L    ALT 12 10 - 44 U/L    Anion Gap 11 8 - 16 mmol/L    eGFR if African American >60 >60 mL/min/1.73 m^2    eGFR if non African American >60 >60 mL/min/1.73 m^2   Troponin I #1   Result Value Ref Range    Troponin I 0.023 0.000 - 0.026 ng/mL   B-Type natriuretic peptide (BNP)   Result Value Ref Range     (H) 0 - 99 pg/mL   Protime-INR   Result Value Ref Range    Prothrombin Time 10.5 9.0 - 12.5 sec    INR 1.0 0.8 - 1.2   APTT   Result Value Ref Range    aPTT 27.1 21.0 - 32.0 sec   Urinalysis, Reflex to Urine Culture Urine, Clean Catch   Result Value Ref Range    Specimen UA Urine, Catheterized     Color, UA Yellow Yellow, Straw, Zayda    Appearance, UA Clear Clear    pH, UA 6.0 5.0 - 8.0    Specific Gravity, UA 1.025 1.005 - 1.030    Protein, UA Negative Negative    Glucose, UA Negative Negative    Ketones, UA Negative Negative    Bilirubin (UA) Negative Negative    Occult Blood UA 1+ (A) Negative    Nitrite, UA Negative Negative    Urobilinogen, UA Negative <2.0 EU/dL    Leukocytes, UA Negative Negative   Urinalysis Microscopic   Result Value Ref Range    RBC, UA 1 0 - 4 /hpf    WBC, UA 1 0 - 5 /hpf    Bacteria None None-Occ /hpf    Yeast, UA None None    Squam Epithel, UA 1 /hpf    Microscopic Comment SEE COMMENT    Lactic acid, plasma   Result Value Ref Range    Lactate (Lactic Acid) 0.8 0.5 - 2.2 mmol/L   Procalcitonin   Result Value Ref Range    Procalcitonin 0.03 <0.25 ng/mL   Lipase   Result Value Ref Range    Lipase 17 4 - 60 U/L   Basic Metabolic Panel (BMP)   Result Value Ref Range    Sodium 137 136  - 145 mmol/L    Potassium 4.0 3.5 - 5.1 mmol/L    Chloride 100 95 - 110 mmol/L    CO2 26 23 - 29 mmol/L    Glucose 180 (H) 70 - 110 mg/dL    BUN, Bld 17 8 - 23 mg/dL    Creatinine 0.7 0.5 - 1.4 mg/dL    Calcium 9.1 8.7 - 10.5 mg/dL    Anion Gap 11 8 - 16 mmol/L    eGFR if African American >60 >60 mL/min/1.73 m^2    eGFR if non African American >60 >60 mL/min/1.73 m^2   CBC with Automated Differential   Result Value Ref Range    WBC 9.89 3.90 - 12.70 K/uL    RBC 4.44 4.00 - 5.40 M/uL    Hemoglobin 12.8 12.0 - 16.0 g/dL    Hematocrit 40.2 37.0 - 48.5 %    Mean Corpuscular Volume 91 82 - 98 fL    Mean Corpuscular Hemoglobin 28.8 27.0 - 31.0 pg    Mean Corpuscular Hemoglobin Conc 31.8 (L) 32.0 - 36.0 g/dL    RDW 12.4 11.5 - 14.5 %    Platelets 208 150 - 350 K/uL    MPV 10.4 9.2 - 12.9 fL    Immature Granulocytes 1.0 (H) 0.0 - 0.5 %    Gran # (ANC) 8.8 (H) 1.8 - 7.7 K/uL    Immature Grans (Abs) 0.10 (H) 0.00 - 0.04 K/uL    Lymph # 0.9 (L) 1.0 - 4.8 K/uL    Mono # 0.1 (L) 0.3 - 1.0 K/uL    Eos # 0.0 0.0 - 0.5 K/uL    Baso # 0.02 0.00 - 0.20 K/uL    nRBC 0 0 /100 WBC    Gran% 89.4 (H) 38.0 - 73.0 %    Lymph% 8.6 (L) 18.0 - 48.0 %    Mono% 0.8 (L) 4.0 - 15.0 %    Eosinophil% 0.0 0.0 - 8.0 %    Basophil% 0.2 0.0 - 1.9 %    Differential Method Automated    TSH   Result Value Ref Range    TSH 0.742 0.400 - 4.000 uIU/mL   Basic Metabolic Panel (BMP)   Result Value Ref Range    Sodium 142 136 - 145 mmol/L    Potassium 4.4 3.5 - 5.1 mmol/L    Chloride 98 95 - 110 mmol/L    CO2 30 (H) 23 - 29 mmol/L    Glucose 241 (H) 70 - 110 mg/dL    BUN, Bld 18 8 - 23 mg/dL    Creatinine 0.7 0.5 - 1.4 mg/dL    Calcium 9.7 8.7 - 10.5 mg/dL    Anion Gap 14 8 - 16 mmol/L    eGFR if African American >60 >60 mL/min/1.73 m^2    eGFR if non African American >60 >60 mL/min/1.73 m^2   CBC with Automated Differential   Result Value Ref Range    WBC 13.07 (H) 3.90 - 12.70 K/uL    RBC 4.54 4.00 - 5.40 M/uL    Hemoglobin 13.0 12.0 - 16.0 g/dL    Hematocrit  40.9 37.0 - 48.5 %    Mean Corpuscular Volume 90 82 - 98 fL    Mean Corpuscular Hemoglobin 28.6 27.0 - 31.0 pg    Mean Corpuscular Hemoglobin Conc 31.8 (L) 32.0 - 36.0 g/dL    RDW 12.1 11.5 - 14.5 %    Platelets 225 150 - 350 K/uL    MPV 10.1 9.2 - 12.9 fL    Immature Granulocytes 1.2 (H) 0.0 - 0.5 %    Gran # (ANC) 11.2 (H) 1.8 - 7.7 K/uL    Immature Grans (Abs) 0.16 (H) 0.00 - 0.04 K/uL    Lymph # 1.1 1.0 - 4.8 K/uL    Mono # 0.6 0.3 - 1.0 K/uL    Eos # 0.0 0.0 - 0.5 K/uL    Baso # 0.03 0.00 - 0.20 K/uL    nRBC 0 0 /100 WBC    Gran% 85.7 (H) 38.0 - 73.0 %    Lymph% 8.7 (L) 18.0 - 48.0 %    Mono% 4.2 4.0 - 15.0 %    Eosinophil% 0.0 0.0 - 8.0 %    Basophil% 0.2 0.0 - 1.9 %    Differential Method Automated    Hemoglobin A1c   Result Value Ref Range    Hemoglobin A1C 6.0 (H) 4.0 - 5.6 %    Estimated Avg Glucose 126 68 - 131 mg/dL   Basic Metabolic Panel (BMP)   Result Value Ref Range    Sodium 137 136 - 145 mmol/L    Potassium 4.0 3.5 - 5.1 mmol/L    Chloride 94 (L) 95 - 110 mmol/L    CO2 30 (H) 23 - 29 mmol/L    Glucose 170 (H) 70 - 110 mg/dL    BUN, Bld 20 8 - 23 mg/dL    Creatinine 0.8 0.5 - 1.4 mg/dL    Calcium 9.6 8.7 - 10.5 mg/dL    Anion Gap 13 8 - 16 mmol/L    eGFR if African American >60 >60 mL/min/1.73 m^2    eGFR if non African American >60 >60 mL/min/1.73 m^2   CBC with Automated Differential   Result Value Ref Range    WBC 13.84 (H) 3.90 - 12.70 K/uL    RBC 4.54 4.00 - 5.40 M/uL    Hemoglobin 13.1 12.0 - 16.0 g/dL    Hematocrit 40.4 37.0 - 48.5 %    Mean Corpuscular Volume 89 82 - 98 fL    Mean Corpuscular Hemoglobin 28.9 27.0 - 31.0 pg    Mean Corpuscular Hemoglobin Conc 32.4 32.0 - 36.0 g/dL    RDW 12.0 11.5 - 14.5 %    Platelets 252 150 - 350 K/uL    MPV 10.4 9.2 - 12.9 fL    Immature Granulocytes 1.8 (H) 0.0 - 0.5 %    Gran # (ANC) 11.4 (H) 1.8 - 7.7 K/uL    Immature Grans (Abs) 0.25 (H) 0.00 - 0.04 K/uL    Lymph # 1.6 1.0 - 4.8 K/uL    Mono # 0.6 0.3 - 1.0 K/uL    Eos # 0.0 0.0 - 0.5 K/uL    Baso #  0.03 0.00 - 0.20 K/uL    nRBC 0 0 /100 WBC    Gran% 82.4 (H) 38.0 - 73.0 %    Lymph% 11.3 (L) 18.0 - 48.0 %    Mono% 4.3 4.0 - 15.0 %    Eosinophil% 0.0 0.0 - 8.0 %    Basophil% 0.2 0.0 - 1.9 %    Differential Method Automated    Basic Metabolic Panel (BMP)   Result Value Ref Range    Sodium 138 136 - 145 mmol/L    Potassium 4.0 3.5 - 5.1 mmol/L    Chloride 93 (L) 95 - 110 mmol/L    CO2 33 (H) 23 - 29 mmol/L    Glucose 155 (H) 70 - 110 mg/dL    BUN, Bld 21 8 - 23 mg/dL    Creatinine 0.8 0.5 - 1.4 mg/dL    Calcium 9.8 8.7 - 10.5 mg/dL    Anion Gap 12 8 - 16 mmol/L    eGFR if African American >60 >60 mL/min/1.73 m^2    eGFR if non African American >60 >60 mL/min/1.73 m^2   CBC with Automated Differential   Result Value Ref Range    WBC 12.43 3.90 - 12.70 K/uL    RBC 4.82 4.00 - 5.40 M/uL    Hemoglobin 13.9 12.0 - 16.0 g/dL    Hematocrit 42.9 37.0 - 48.5 %    Mean Corpuscular Volume 89 82 - 98 fL    Mean Corpuscular Hemoglobin 28.8 27.0 - 31.0 pg    Mean Corpuscular Hemoglobin Conc 32.4 32.0 - 36.0 g/dL    RDW 11.9 11.5 - 14.5 %    Platelets 277 150 - 350 K/uL    MPV 10.4 9.2 - 12.9 fL    Immature Granulocytes 3.8 (H) 0.0 - 0.5 %    Gran # (ANC) 9.4 (H) 1.8 - 7.7 K/uL    Immature Grans (Abs) 0.47 (H) 0.00 - 0.04 K/uL    Lymph # 1.7 1.0 - 4.8 K/uL    Mono # 0.9 0.3 - 1.0 K/uL    Eos # 0.0 0.0 - 0.5 K/uL    Baso # 0.07 0.00 - 0.20 K/uL    nRBC 0 0 /100 WBC    Gran% 75.5 (H) 38.0 - 73.0 %    Lymph% 13.3 (L) 18.0 - 48.0 %    Mono% 6.8 4.0 - 15.0 %    Eosinophil% 0.0 0.0 - 8.0 %    Basophil% 0.6 0.0 - 1.9 %    Differential Method Automated    Brain natriuretic peptide   Result Value Ref Range     (H) 0 - 99 pg/mL   2D echo with color flow doppler   Result Value Ref Range    QEF 60 55 - 65    Diastolic Dysfunction No    ISTAT PROCEDURE   Result Value Ref Range    POC PH 7.400 7.35 - 7.45    POC PCO2 44.9 35 - 45 mmHg    POC PO2 59 (LL) 80 - 100 mmHg    POC HCO3 27.8 24 - 28 mmol/L    POC BE 3 -2 to 2 mmol/L    POC  SATURATED O2 90 (L) 95 - 100 %    Sample ARTERIAL     Site LF     Allens Test Pass     DelSys Room Air     Mode SPONT     FiO2 21    POCT glucose   Result Value Ref Range    POCT Glucose 241 (H) 70 - 110 mg/dL   POCT glucose   Result Value Ref Range    POCT Glucose 215 (H) 70 - 110 mg/dL   POCT glucose   Result Value Ref Range    POCT Glucose 174 (H) 70 - 110 mg/dL   POCT glucose   Result Value Ref Range    POCT Glucose 181 (H) 70 - 110 mg/dL   POCT glucose   Result Value Ref Range    POCT Glucose 307 (H) 70 - 110 mg/dL   POCT glucose   Result Value Ref Range    POCT Glucose 223 (H) 70 - 110 mg/dL   POCT glucose   Result Value Ref Range    POCT Glucose 173 (H) 70 - 110 mg/dL   POCT glucose   Result Value Ref Range    POCT Glucose 158 (H) 70 - 110 mg/dL   POCT glucose   Result Value Ref Range    POCT Glucose 224 (H) 70 - 110 mg/dL     Significant Imaging: I have reviewed all pertinent imaging results/findings within the past 24 hours.   Results for orders placed or performed during the hospital encounter of 01/04/20   Culture, Respiratory with Gram Stain   Result Value Ref Range    Respiratory Culture No S aureus or Pseudomonas isolated.     Respiratory Culture Normal respiratory ramses     Gram Stain (Respiratory) <10 epithelial cells per low power field.     Gram Stain (Respiratory) Rare WBC's     Gram Stain (Respiratory) Many Gram positive cocci     Gram Stain (Respiratory) Rare Gram negative rods    Blood Culture #1 **CANNOT BE ORDERED STAT**   Result Value Ref Range    Blood Culture, Routine No Growth to date     Blood Culture, Routine No Growth to date     Blood Culture, Routine No Growth to date     Blood Culture, Routine No Growth to date    Blood Culture #2 **CANNOT BE ORDERED STAT**   Result Value Ref Range    Blood Culture, Routine No Growth to date     Blood Culture, Routine No Growth to date     Blood Culture, Routine No Growth to date     Blood Culture, Routine No Growth to date    Influenza A & B by  Molecular   Result Value Ref Range    Influenza A, Molecular Negative Negative    Influenza B, Molecular Negative Negative    Flu A & B Source Nasal swab    CBC auto differential   Result Value Ref Range    WBC 12.83 (H) 3.90 - 12.70 K/uL    RBC 4.92 4.00 - 5.40 M/uL    Hemoglobin 14.2 12.0 - 16.0 g/dL    Hematocrit 44.2 37.0 - 48.5 %    Mean Corpuscular Volume 90 82 - 98 fL    Mean Corpuscular Hemoglobin 28.9 27.0 - 31.0 pg    Mean Corpuscular Hemoglobin Conc 32.1 32.0 - 36.0 g/dL    RDW 12.4 11.5 - 14.5 %    Platelets 246 150 - 350 K/uL    MPV 10.0 9.2 - 12.9 fL    Immature Granulocytes 0.7 (H) 0.0 - 0.5 %    Gran # (ANC) 9.5 (H) 1.8 - 7.7 K/uL    Immature Grans (Abs) 0.09 (H) 0.00 - 0.04 K/uL    Lymph # 1.9 1.0 - 4.8 K/uL    Mono # 1.3 (H) 0.3 - 1.0 K/uL    Eos # 0.0 0.0 - 0.5 K/uL    Baso # 0.03 0.00 - 0.20 K/uL    nRBC 0 0 /100 WBC    Gran% 74.0 (H) 38.0 - 73.0 %    Lymph% 15.1 (L) 18.0 - 48.0 %    Mono% 9.9 4.0 - 15.0 %    Eosinophil% 0.1 0.0 - 8.0 %    Basophil% 0.2 0.0 - 1.9 %    Differential Method Automated    Comprehensive metabolic panel   Result Value Ref Range    Sodium 137 136 - 145 mmol/L    Potassium 3.8 3.5 - 5.1 mmol/L    Chloride 100 95 - 110 mmol/L    CO2 26 23 - 29 mmol/L    Glucose 112 (H) 70 - 110 mg/dL    BUN, Bld 15 8 - 23 mg/dL    Creatinine 0.7 0.5 - 1.4 mg/dL    Calcium 9.6 8.7 - 10.5 mg/dL    Total Protein 7.2 6.0 - 8.4 g/dL    Albumin 3.6 3.5 - 5.2 g/dL    Total Bilirubin 0.7 0.1 - 1.0 mg/dL    Alkaline Phosphatase 106 55 - 135 U/L    AST 24 10 - 40 U/L    ALT 12 10 - 44 U/L    Anion Gap 11 8 - 16 mmol/L    eGFR if African American >60 >60 mL/min/1.73 m^2    eGFR if non African American >60 >60 mL/min/1.73 m^2   Troponin I #1   Result Value Ref Range    Troponin I 0.023 0.000 - 0.026 ng/mL   B-Type natriuretic peptide (BNP)   Result Value Ref Range     (H) 0 - 99 pg/mL   Protime-INR   Result Value Ref Range    Prothrombin Time 10.5 9.0 - 12.5 sec    INR 1.0 0.8 - 1.2   APTT    Result Value Ref Range    aPTT 27.1 21.0 - 32.0 sec   Urinalysis, Reflex to Urine Culture Urine, Clean Catch   Result Value Ref Range    Specimen UA Urine, Catheterized     Color, UA Yellow Yellow, Straw, Zayda    Appearance, UA Clear Clear    pH, UA 6.0 5.0 - 8.0    Specific Gravity, UA 1.025 1.005 - 1.030    Protein, UA Negative Negative    Glucose, UA Negative Negative    Ketones, UA Negative Negative    Bilirubin (UA) Negative Negative    Occult Blood UA 1+ (A) Negative    Nitrite, UA Negative Negative    Urobilinogen, UA Negative <2.0 EU/dL    Leukocytes, UA Negative Negative   Urinalysis Microscopic   Result Value Ref Range    RBC, UA 1 0 - 4 /hpf    WBC, UA 1 0 - 5 /hpf    Bacteria None None-Occ /hpf    Yeast, UA None None    Squam Epithel, UA 1 /hpf    Microscopic Comment SEE COMMENT    Lactic acid, plasma   Result Value Ref Range    Lactate (Lactic Acid) 0.8 0.5 - 2.2 mmol/L   Procalcitonin   Result Value Ref Range    Procalcitonin 0.03 <0.25 ng/mL   Lipase   Result Value Ref Range    Lipase 17 4 - 60 U/L   Basic Metabolic Panel (BMP)   Result Value Ref Range    Sodium 137 136 - 145 mmol/L    Potassium 4.0 3.5 - 5.1 mmol/L    Chloride 100 95 - 110 mmol/L    CO2 26 23 - 29 mmol/L    Glucose 180 (H) 70 - 110 mg/dL    BUN, Bld 17 8 - 23 mg/dL    Creatinine 0.7 0.5 - 1.4 mg/dL    Calcium 9.1 8.7 - 10.5 mg/dL    Anion Gap 11 8 - 16 mmol/L    eGFR if African American >60 >60 mL/min/1.73 m^2    eGFR if non African American >60 >60 mL/min/1.73 m^2   CBC with Automated Differential   Result Value Ref Range    WBC 9.89 3.90 - 12.70 K/uL    RBC 4.44 4.00 - 5.40 M/uL    Hemoglobin 12.8 12.0 - 16.0 g/dL    Hematocrit 40.2 37.0 - 48.5 %    Mean Corpuscular Volume 91 82 - 98 fL    Mean Corpuscular Hemoglobin 28.8 27.0 - 31.0 pg    Mean Corpuscular Hemoglobin Conc 31.8 (L) 32.0 - 36.0 g/dL    RDW 12.4 11.5 - 14.5 %    Platelets 208 150 - 350 K/uL    MPV 10.4 9.2 - 12.9 fL    Immature Granulocytes 1.0 (H) 0.0 - 0.5 %     Gran # (ANC) 8.8 (H) 1.8 - 7.7 K/uL    Immature Grans (Abs) 0.10 (H) 0.00 - 0.04 K/uL    Lymph # 0.9 (L) 1.0 - 4.8 K/uL    Mono # 0.1 (L) 0.3 - 1.0 K/uL    Eos # 0.0 0.0 - 0.5 K/uL    Baso # 0.02 0.00 - 0.20 K/uL    nRBC 0 0 /100 WBC    Gran% 89.4 (H) 38.0 - 73.0 %    Lymph% 8.6 (L) 18.0 - 48.0 %    Mono% 0.8 (L) 4.0 - 15.0 %    Eosinophil% 0.0 0.0 - 8.0 %    Basophil% 0.2 0.0 - 1.9 %    Differential Method Automated    TSH   Result Value Ref Range    TSH 0.742 0.400 - 4.000 uIU/mL   Basic Metabolic Panel (BMP)   Result Value Ref Range    Sodium 142 136 - 145 mmol/L    Potassium 4.4 3.5 - 5.1 mmol/L    Chloride 98 95 - 110 mmol/L    CO2 30 (H) 23 - 29 mmol/L    Glucose 241 (H) 70 - 110 mg/dL    BUN, Bld 18 8 - 23 mg/dL    Creatinine 0.7 0.5 - 1.4 mg/dL    Calcium 9.7 8.7 - 10.5 mg/dL    Anion Gap 14 8 - 16 mmol/L    eGFR if African American >60 >60 mL/min/1.73 m^2    eGFR if non African American >60 >60 mL/min/1.73 m^2   CBC with Automated Differential   Result Value Ref Range    WBC 13.07 (H) 3.90 - 12.70 K/uL    RBC 4.54 4.00 - 5.40 M/uL    Hemoglobin 13.0 12.0 - 16.0 g/dL    Hematocrit 40.9 37.0 - 48.5 %    Mean Corpuscular Volume 90 82 - 98 fL    Mean Corpuscular Hemoglobin 28.6 27.0 - 31.0 pg    Mean Corpuscular Hemoglobin Conc 31.8 (L) 32.0 - 36.0 g/dL    RDW 12.1 11.5 - 14.5 %    Platelets 225 150 - 350 K/uL    MPV 10.1 9.2 - 12.9 fL    Immature Granulocytes 1.2 (H) 0.0 - 0.5 %    Gran # (ANC) 11.2 (H) 1.8 - 7.7 K/uL    Immature Grans (Abs) 0.16 (H) 0.00 - 0.04 K/uL    Lymph # 1.1 1.0 - 4.8 K/uL    Mono # 0.6 0.3 - 1.0 K/uL    Eos # 0.0 0.0 - 0.5 K/uL    Baso # 0.03 0.00 - 0.20 K/uL    nRBC 0 0 /100 WBC    Gran% 85.7 (H) 38.0 - 73.0 %    Lymph% 8.7 (L) 18.0 - 48.0 %    Mono% 4.2 4.0 - 15.0 %    Eosinophil% 0.0 0.0 - 8.0 %    Basophil% 0.2 0.0 - 1.9 %    Differential Method Automated    Hemoglobin A1c   Result Value Ref Range    Hemoglobin A1C 6.0 (H) 4.0 - 5.6 %    Estimated Avg Glucose 126 68 - 131 mg/dL    Basic Metabolic Panel (BMP)   Result Value Ref Range    Sodium 137 136 - 145 mmol/L    Potassium 4.0 3.5 - 5.1 mmol/L    Chloride 94 (L) 95 - 110 mmol/L    CO2 30 (H) 23 - 29 mmol/L    Glucose 170 (H) 70 - 110 mg/dL    BUN, Bld 20 8 - 23 mg/dL    Creatinine 0.8 0.5 - 1.4 mg/dL    Calcium 9.6 8.7 - 10.5 mg/dL    Anion Gap 13 8 - 16 mmol/L    eGFR if African American >60 >60 mL/min/1.73 m^2    eGFR if non African American >60 >60 mL/min/1.73 m^2   CBC with Automated Differential   Result Value Ref Range    WBC 13.84 (H) 3.90 - 12.70 K/uL    RBC 4.54 4.00 - 5.40 M/uL    Hemoglobin 13.1 12.0 - 16.0 g/dL    Hematocrit 40.4 37.0 - 48.5 %    Mean Corpuscular Volume 89 82 - 98 fL    Mean Corpuscular Hemoglobin 28.9 27.0 - 31.0 pg    Mean Corpuscular Hemoglobin Conc 32.4 32.0 - 36.0 g/dL    RDW 12.0 11.5 - 14.5 %    Platelets 252 150 - 350 K/uL    MPV 10.4 9.2 - 12.9 fL    Immature Granulocytes 1.8 (H) 0.0 - 0.5 %    Gran # (ANC) 11.4 (H) 1.8 - 7.7 K/uL    Immature Grans (Abs) 0.25 (H) 0.00 - 0.04 K/uL    Lymph # 1.6 1.0 - 4.8 K/uL    Mono # 0.6 0.3 - 1.0 K/uL    Eos # 0.0 0.0 - 0.5 K/uL    Baso # 0.03 0.00 - 0.20 K/uL    nRBC 0 0 /100 WBC    Gran% 82.4 (H) 38.0 - 73.0 %    Lymph% 11.3 (L) 18.0 - 48.0 %    Mono% 4.3 4.0 - 15.0 %    Eosinophil% 0.0 0.0 - 8.0 %    Basophil% 0.2 0.0 - 1.9 %    Differential Method Automated    Basic Metabolic Panel (BMP)   Result Value Ref Range    Sodium 138 136 - 145 mmol/L    Potassium 4.0 3.5 - 5.1 mmol/L    Chloride 93 (L) 95 - 110 mmol/L    CO2 33 (H) 23 - 29 mmol/L    Glucose 155 (H) 70 - 110 mg/dL    BUN, Bld 21 8 - 23 mg/dL    Creatinine 0.8 0.5 - 1.4 mg/dL    Calcium 9.8 8.7 - 10.5 mg/dL    Anion Gap 12 8 - 16 mmol/L    eGFR if African American >60 >60 mL/min/1.73 m^2    eGFR if non African American >60 >60 mL/min/1.73 m^2   CBC with Automated Differential   Result Value Ref Range    WBC 12.43 3.90 - 12.70 K/uL    RBC 4.82 4.00 - 5.40 M/uL    Hemoglobin 13.9 12.0 - 16.0 g/dL     Hematocrit 42.9 37.0 - 48.5 %    Mean Corpuscular Volume 89 82 - 98 fL    Mean Corpuscular Hemoglobin 28.8 27.0 - 31.0 pg    Mean Corpuscular Hemoglobin Conc 32.4 32.0 - 36.0 g/dL    RDW 11.9 11.5 - 14.5 %    Platelets 277 150 - 350 K/uL    MPV 10.4 9.2 - 12.9 fL    Immature Granulocytes 3.8 (H) 0.0 - 0.5 %    Gran # (ANC) 9.4 (H) 1.8 - 7.7 K/uL    Immature Grans (Abs) 0.47 (H) 0.00 - 0.04 K/uL    Lymph # 1.7 1.0 - 4.8 K/uL    Mono # 0.9 0.3 - 1.0 K/uL    Eos # 0.0 0.0 - 0.5 K/uL    Baso # 0.07 0.00 - 0.20 K/uL    nRBC 0 0 /100 WBC    Gran% 75.5 (H) 38.0 - 73.0 %    Lymph% 13.3 (L) 18.0 - 48.0 %    Mono% 6.8 4.0 - 15.0 %    Eosinophil% 0.0 0.0 - 8.0 %    Basophil% 0.6 0.0 - 1.9 %    Differential Method Automated    Brain natriuretic peptide   Result Value Ref Range     (H) 0 - 99 pg/mL   2D echo with color flow doppler   Result Value Ref Range    QEF 60 55 - 65    Diastolic Dysfunction No    ISTAT PROCEDURE   Result Value Ref Range    POC PH 7.400 7.35 - 7.45    POC PCO2 44.9 35 - 45 mmHg    POC PO2 59 (LL) 80 - 100 mmHg    POC HCO3 27.8 24 - 28 mmol/L    POC BE 3 -2 to 2 mmol/L    POC SATURATED O2 90 (L) 95 - 100 %    Sample ARTERIAL     Site LF     Allens Test Pass     DelSys Room Air     Mode SPONT     FiO2 21    POCT glucose   Result Value Ref Range    POCT Glucose 241 (H) 70 - 110 mg/dL   POCT glucose   Result Value Ref Range    POCT Glucose 215 (H) 70 - 110 mg/dL   POCT glucose   Result Value Ref Range    POCT Glucose 174 (H) 70 - 110 mg/dL   POCT glucose   Result Value Ref Range    POCT Glucose 181 (H) 70 - 110 mg/dL   POCT glucose   Result Value Ref Range    POCT Glucose 307 (H) 70 - 110 mg/dL   POCT glucose   Result Value Ref Range    POCT Glucose 223 (H) 70 - 110 mg/dL   POCT glucose   Result Value Ref Range    POCT Glucose 173 (H) 70 - 110 mg/dL   POCT glucose   Result Value Ref Range    POCT Glucose 158 (H) 70 - 110 mg/dL   POCT glucose   Result Value Ref Range    POCT Glucose 224 (H) 70 - 110  mg/dL       Assessment/Plan:      * Acute hypoxemic respiratory failure  - Admitted to Tele  - Likely secondary to infectious bronchiolitis  - Influenza negative  - Continue supplemental O2  - Continue IV ABX, Solumedrol  -ADD formoteral and budeside  - Scheduled duonebs  -  repeat home O2 eval: worsening hypoxia 86%  -Add one dose IV Lasix 40 mg    Hyperglycemia  - A1c 6.0   - Likely secondary to steroids given no history of DM  - Accu checks ACHS with SSI PRN  - Expected to improve with weaning of Solu-Medrol  - Outpatient f/u with PCP for monitoring of A1c      Atrial fibrillation  - New onset  - HR now rate controlled  - Cardiology consulted and started on Metoprolol  - Continue home Eliquis for CVA prophylaxis  - Tele monitoring      History of pulmonary embolism  - Onset approximately 8 years ago from DVT  - Compliant with home Eliquis, will continue  - Monitor      Hypothyroidism  - TSH normal  - Continue home Synthroid     HTN (hypertension)  - BP under fair control  - Continue home meds  - monitor and adjust as needed      Bronchiolitis, acute  - BC show NGTD  - Sputum cx shows normal respiratory ramses  - Viral panel pending  - Continue IV ABX  - Change Solumedrol to 40 mg IVP q 8 hours  - Supplemental O2  - Acapella  - Scheduled Duonebs  - Anticipate DC home in AM on oral ABX if she remains stable overnight  - Monitor      Sepsis  - Likely secondary to infectious bronchiolitis  - Lactic acid normal  - BC show NGTD  - Continue IV ABX  - Currently hemodynamically stable        VTE Risk Mitigation (From admission, onward)         Ordered     apixaban tablet 5 mg  2 times daily      01/04/20 1455     IP VTE HIGH RISK PATIENT  Once      01/04/20 1154     Place sequential compression device  Until discontinued      01/04/20 1154                      Dianne Connell NP  Department of Hospital Medicine   [facility]

## 2020-01-09 VITALS
DIASTOLIC BLOOD PRESSURE: 85 MMHG | OXYGEN SATURATION: 96 % | SYSTOLIC BLOOD PRESSURE: 133 MMHG | WEIGHT: 218.5 LBS | TEMPERATURE: 98 F | BODY MASS INDEX: 34.29 KG/M2 | RESPIRATION RATE: 18 BRPM | HEART RATE: 110 BPM | HEIGHT: 67 IN

## 2020-01-09 PROBLEM — A41.9 SEPSIS: Status: RESOLVED | Noted: 2020-01-04 | Resolved: 2020-01-09

## 2020-01-09 PROBLEM — J96.01 ACUTE HYPOXEMIC RESPIRATORY FAILURE: Status: RESOLVED | Noted: 2020-01-04 | Resolved: 2020-01-09

## 2020-01-09 LAB
ANION GAP SERPL CALC-SCNC: 15 MMOL/L (ref 8–16)
BACTERIA BLD CULT: NORMAL
BACTERIA BLD CULT: NORMAL
BASOPHILS NFR BLD: 0 % (ref 0–1.9)
BUN SERPL-MCNC: 28 MG/DL (ref 8–23)
CALCIUM SERPL-MCNC: 9.6 MG/DL (ref 8.7–10.5)
CHLORIDE SERPL-SCNC: 89 MMOL/L (ref 95–110)
CO2 SERPL-SCNC: 32 MMOL/L (ref 23–29)
CREAT SERPL-MCNC: 0.8 MG/DL (ref 0.5–1.4)
DACRYOCYTES BLD QL SMEAR: ABNORMAL
DIFFERENTIAL METHOD: ABNORMAL
EOSINOPHIL NFR BLD: 0 % (ref 0–8)
ERYTHROCYTE [DISTWIDTH] IN BLOOD BY AUTOMATED COUNT: 11.9 % (ref 11.5–14.5)
EST. GFR  (AFRICAN AMERICAN): >60 ML/MIN/1.73 M^2
EST. GFR  (NON AFRICAN AMERICAN): >60 ML/MIN/1.73 M^2
GLUCOSE SERPL-MCNC: 180 MG/DL (ref 70–110)
HCT VFR BLD AUTO: 45.7 % (ref 37–48.5)
HGB BLD-MCNC: 14.8 G/DL (ref 12–16)
IMM GRANULOCYTES # BLD AUTO: ABNORMAL K/UL (ref 0–0.04)
IMM GRANULOCYTES NFR BLD AUTO: ABNORMAL % (ref 0–0.5)
LYMPHOCYTES NFR BLD: 11 % (ref 18–48)
MCH RBC QN AUTO: 28.4 PG (ref 27–31)
MCHC RBC AUTO-ENTMCNC: 32.4 G/DL (ref 32–36)
MCV RBC AUTO: 88 FL (ref 82–98)
MONOCYTES NFR BLD: 5 % (ref 4–15)
NEUTROPHILS NFR BLD: 84 % (ref 38–73)
NRBC BLD-RTO: 0 /100 WBC
OVALOCYTES BLD QL SMEAR: ABNORMAL
PLATELET # BLD AUTO: 278 K/UL (ref 150–350)
PMV BLD AUTO: 10.2 FL (ref 9.2–12.9)
POCT GLUCOSE: 157 MG/DL (ref 70–110)
POIKILOCYTOSIS BLD QL SMEAR: SLIGHT
POTASSIUM SERPL-SCNC: 3.4 MMOL/L (ref 3.5–5.1)
RBC # BLD AUTO: 5.21 M/UL (ref 4–5.4)
SODIUM SERPL-SCNC: 136 MMOL/L (ref 136–145)
WBC # BLD AUTO: 13.23 K/UL (ref 3.9–12.7)

## 2020-01-09 PROCEDURE — 25000242 PHARM REV CODE 250 ALT 637 W/ HCPCS: Performed by: NURSE PRACTITIONER

## 2020-01-09 PROCEDURE — 85007 BL SMEAR W/DIFF WBC COUNT: CPT

## 2020-01-09 PROCEDURE — 63600175 PHARM REV CODE 636 W HCPCS: Performed by: NURSE PRACTITIONER

## 2020-01-09 PROCEDURE — 94664 DEMO&/EVAL PT USE INHALER: CPT

## 2020-01-09 PROCEDURE — 94640 AIRWAY INHALATION TREATMENT: CPT

## 2020-01-09 PROCEDURE — 25000003 PHARM REV CODE 250: Performed by: NURSE PRACTITIONER

## 2020-01-09 PROCEDURE — 25000003 PHARM REV CODE 250: Performed by: INTERNAL MEDICINE

## 2020-01-09 PROCEDURE — 80048 BASIC METABOLIC PNL TOTAL CA: CPT

## 2020-01-09 PROCEDURE — 36415 COLL VENOUS BLD VENIPUNCTURE: CPT

## 2020-01-09 PROCEDURE — 99900035 HC TECH TIME PER 15 MIN (STAT)

## 2020-01-09 PROCEDURE — 85027 COMPLETE CBC AUTOMATED: CPT

## 2020-01-09 PROCEDURE — 27000221 HC OXYGEN, UP TO 24 HOURS

## 2020-01-09 RX ORDER — ALBUTEROL SULFATE 90 UG/1
2 AEROSOL, METERED RESPIRATORY (INHALATION) EVERY 4 HOURS PRN
Qty: 18 G | Refills: 0 | Status: SHIPPED | OUTPATIENT
Start: 2020-01-09

## 2020-01-09 RX ORDER — PREDNISONE 10 MG/1
TABLET ORAL
Qty: 30 TABLET | Refills: 0 | Status: SHIPPED | OUTPATIENT
Start: 2020-01-09

## 2020-01-09 RX ORDER — GUAIFENESIN 600 MG/1
600 TABLET, EXTENDED RELEASE ORAL 2 TIMES DAILY
Qty: 20 TABLET | Refills: 0 | Status: SHIPPED | OUTPATIENT
Start: 2020-01-09 | End: 2020-01-19

## 2020-01-09 RX ORDER — LEVOFLOXACIN 750 MG/1
750 TABLET ORAL DAILY
Qty: 5 TABLET | Refills: 0 | Status: SHIPPED | OUTPATIENT
Start: 2020-01-09 | End: 2020-01-14

## 2020-01-09 RX ORDER — POTASSIUM CHLORIDE 20 MEQ/1
40 TABLET, EXTENDED RELEASE ORAL ONCE
Status: COMPLETED | OUTPATIENT
Start: 2020-01-09 | End: 2020-01-09

## 2020-01-09 RX ORDER — POTASSIUM CHLORIDE 20 MEQ/1
20 TABLET, EXTENDED RELEASE ORAL ONCE
Status: DISCONTINUED | OUTPATIENT
Start: 2020-01-09 | End: 2020-01-09 | Stop reason: HOSPADM

## 2020-01-09 RX ORDER — METOPROLOL SUCCINATE 25 MG/1
25 TABLET, EXTENDED RELEASE ORAL DAILY
Qty: 30 TABLET | Refills: 0 | Status: SHIPPED | OUTPATIENT
Start: 2020-01-10 | End: 2020-01-09

## 2020-01-09 RX ORDER — METOPROLOL SUCCINATE 25 MG/1
25 TABLET, EXTENDED RELEASE ORAL DAILY
Qty: 30 TABLET | Refills: 0 | Status: SHIPPED | OUTPATIENT
Start: 2020-01-10 | End: 2021-01-09

## 2020-01-09 RX ADMIN — METOPROLOL SUCCINATE 25 MG: 25 TABLET, EXTENDED RELEASE ORAL at 09:01

## 2020-01-09 RX ADMIN — POTASSIUM CHLORIDE 40 MEQ: 1500 TABLET, EXTENDED RELEASE ORAL at 09:01

## 2020-01-09 RX ADMIN — LEVOTHYROXINE SODIUM 50 MCG: 50 TABLET ORAL at 05:01

## 2020-01-09 RX ADMIN — ARFORMOTEROL TARTRATE 15 MCG: 15 SOLUTION RESPIRATORY (INHALATION) at 07:01

## 2020-01-09 RX ADMIN — IPRATROPIUM BROMIDE AND ALBUTEROL SULFATE 3 ML: .5; 3 SOLUTION RESPIRATORY (INHALATION) at 07:01

## 2020-01-09 RX ADMIN — GUAIFENESIN 600 MG: 600 TABLET, EXTENDED RELEASE ORAL at 09:01

## 2020-01-09 RX ADMIN — CEFTRIAXONE 1 G: 1 INJECTION, SOLUTION INTRAVENOUS at 10:01

## 2020-01-09 RX ADMIN — PANTOPRAZOLE SODIUM 40 MG: 40 TABLET, DELAYED RELEASE ORAL at 09:01

## 2020-01-09 RX ADMIN — POLYETHYLENE GLYCOL (3350) 17 G: 17 POWDER, FOR SOLUTION ORAL at 09:01

## 2020-01-09 RX ADMIN — LISINOPRIL 20 MG: 20 TABLET ORAL at 09:01

## 2020-01-09 RX ADMIN — APIXABAN 5 MG: 2.5 TABLET, FILM COATED ORAL at 09:01

## 2020-01-09 RX ADMIN — BUDESONIDE 0.5 MG: 0.5 SUSPENSION RESPIRATORY (INHALATION) at 07:01

## 2020-01-09 RX ADMIN — HYDROCHLOROTHIAZIDE 25 MG: 25 TABLET ORAL at 09:01

## 2020-01-09 RX ADMIN — METHYLPREDNISOLONE SODIUM SUCCINATE 40 MG: 40 INJECTION, POWDER, FOR SOLUTION INTRAMUSCULAR; INTRAVENOUS at 05:01

## 2020-01-09 NOTE — DISCHARGE SUMMARY
[facility] Hospital Medicine  Discharge Summary      Patient Name: Aurea Haprer  MRN: [id number]  Admission Date: 1/4/2020  Hospital Length of Stay: 5 days  Discharge Date and Time:  01/09/2020 10:54 AM  Attending Physician: Gt Deleon, *   Discharging Provider: Cherelle Sierra NP  Primary Care Provider: Primary Doctor No      HPI:   Aurea Harper is a 87 y.o. female patient with a PMHx of HTN, and pulmonary embolus 8 years ago on long term OAC with Eliquis, who presented to the Emergency Department today for evaluation of abdominal pain and vomiting, which onset gradually 2-3 weeks PTA.  Patient reports that she has been feeling sick for the past few weeks and received a steroid shot 2 days ago, but is still experiencing sxs.  She has also been on Levaquin.  Symptoms are episodic and moderate in severity.  No mitigating or exacerbating factors reported.  Associated sxs include SOB, chills, subjective fever, nasal and chest congestion, sore throat, productive cough, body aches, and fatigue.  Patient denies any CP, HA, syncope, diaphoresis, diarrhea, dysuria, flank pain, dizziness, light-headedness, numbness, seizures, and all other sxs at this time.  No further complaints or concerns at this time.  ER workup showed:  Hypoxia with an SaO2 of 85% on RA, and mild tachycardia, otherwise stable VS.  CBC showed WBCs elevated to 12.8k, otherwise unremarkable.  CMP unremarkable.  Troponin negative.  Lactic acid and Procalcitonin both normal.  UA negative.  ABG showed ph 7.00, Pco2 44.9, Po2 59, HCo3 27.8 on RA.  CXR negative for acute process.  CT head showed no CT evidence of an acute intracranial process. Age-appropriate atrophy with mild deep white matter microangiopathy.  Patchy ethmoid mucosal thickening.  Abdominal Xray showed nonobstructive bowel gas pattern.  CT chest showed multifocal infectious bronchiolitis.  No effusions.  BC were drawn and patient was given Rocephin and  "Azithromycin in the ER and Hospital Medicine called for admission.  Influenza pending.  She will be admitted to Tele.  Patient is a Full Code.  Her SDM is her  Enrike who can be reached at [phone].    * No surgery found *      Hospital Course:   On 1/4 patient was admitted to the telemetry unit secondary to Sepsis and Acute hypoxemic respiratory failure thought to be related to infectious bronchiolitis.  BC were drawn and patient was started on scheduled nebulizer treatments in addition to Solu-Medrol IVP every 6 hr, IV ABX, and supplemental oxygen.      As of 1/5 Patient reports improvement in shortness of breath but continues to have wheezing.  BC show NGTD.  Sputum Cx shows normal respiratory ramses.  Influenza negative.  Plan to continue IV antibiotics in addition to nebulizer treatments and IV steroids.  May need pulmonary consult pending clinical course.    As of 1/6 Patient converted to AFib last night, and remains in AFib this morning.  Patient is uncertain of any prior history of AFib.  She is on Eliquis 5 mg p.o. B.i.d. for history of PE, which will be continued.   ECHO pending.  Cardiology consult pending for additional recommendations.  Patient continues to wheeze, but reports improvement in symptoms.  Will taper steroids today.  Viral panel pending.  BC show NGTD.      As of 1/7 Patient reports improvement in SOB, but continues to have audible wheezing and c/o chest "congestion".  Exertional RA SaO2 88%.  Will plan to keep overnight for continued IV steroids and anticipate DC home on oral ABX in the AM if she remains stable overnight.  Order placed to resume HH upon DC for nursing visits and PT/OT.  Will need repeat home O2 eval prior to DC.    1/8/20 - Still on 2 liters O2. ECHO normal. Repeat O2 eval worse today (86% ambulating). Will add budesonide and Formoteral nebulizers. Will give IV Lasix 40 mg x 1 and reevaluate in am. Insurance won't pay for Home O2.     As of 1/9 patient looks much " better today and is currently resting comfortably in bed in no acute distress.  Respirations are even and unlabored and patient is comfortable on room air.  Wheezing has resolved.  HR has remained controlled since starting on Metoprolol.  Home O2 eval completed today and patient did not qualify with a resting room air SaO2 of 96% and exertional of 94%.  VS remain stable, and patient has remained afebrile.  Mild leukocytosis is attributed to steroids.  BC show NGTD.  Sputum culture shows normal respiratory ramses.  Respiratory viral panel still pending.  Case d/w Dr. Chanel, patient seen and examined and deemed medically stable to DC home with  today.  She was instructed to follow up with her PCP within 3 days for post hospital evaluation and monitoring, and with cardiology within 1 week, verbalized positive understanding.  Patient will DC home to complete a prednisone taper in addition to Mucinex, and Levaquin x5 days.  Patient will also be given an albuterol inhaler as needed for wheezing/shortness of breath.  Medications reconciled for DC home.     Consults:   Consults (From admission, onward)        Status Ordering Provider     Inpatient consult to Cardiology  Once     Provider:  Pb Mckenzie MD    Completed DRE SHARPE consult to case management  Once     Provider:  (Not yet assigned)    Completed ESTEBAN REYES          No new Assessment & Plan notes have been filed under this hospital service since the last note was generated.  Service: Hospital Medicine    Final Active Diagnoses:    Diagnosis Date Noted POA    Bronchiolitis, acute [J21.9] 01/04/2020 Unknown    Atrial fibrillation [I48.91] 01/06/2020 Unknown    HTN (hypertension) [I10] 01/04/2020 Unknown    Hypothyroidism [E03.9] 01/04/2020 Unknown    History of pulmonary embolism [Z86.711] 01/04/2020 Unknown    Hyperglycemia [R73.9] 01/06/2020 Unknown      Problems Resolved During this Admission:    Diagnosis Date  Noted Date Resolved POA    PRINCIPAL PROBLEM:  Acute hypoxemic respiratory failure [J96.01] 01/04/2020 01/09/2020 Yes    Sepsis [A41.9] 01/04/2020 01/09/2020 Unknown    Abdominal pain [R10.9] 01/04/2020 01/06/2020 Unknown       Discharged Condition: good    Disposition: Home or Self Care    Follow Up:  Follow-up Information     Dr Dharmesh [place]. Go on 1/13/2020.    Why:  at 1:15 pm  Contact information:  [address] [place] [address] [phone]               Patient Instructions:      Diet Cardiac     Diet diabetic     Notify your health care provider if you experience any of the following:  temperature >100.4     Notify your health care provider if you experience any of the following:  difficulty breathing or increased cough     Activity as tolerated       Significant Diagnostic Studies: Labs:   BMP:   Recent Labs   Lab 01/08/20  0531 01/09/20  0444   * 180*    136   K 4.0 3.4*   CL 93* 89*   CO2 33* 32*   BUN 21 28*   CREATININE 0.8 0.8   CALCIUM 9.8 9.6   , CMP   Recent Labs   Lab 01/08/20  0531 01/09/20  0444    136   K 4.0 3.4*   CL 93* 89*   CO2 33* 32*   * 180*   BUN 21 28*   CREATININE 0.8 0.8   CALCIUM 9.8 9.6   ANIONGAP 12 15   ESTGFRAFRICA >60 >60   EGFRNONAA >60 >60   , CBC   Recent Labs   Lab 01/08/20  0531 01/09/20  0444   WBC 12.43 13.23*   HGB 13.9 14.8   HCT 42.9 45.7    278   , Troponin   Recent Labs   Lab 01/04/20  0818   TROPONINI 0.023    and A1C:   Recent Labs   Lab 01/06/20  0320   HGBA1C 6.0*     Microbiology:   Blood Culture   Lab Results   Component Value Date    LABBLOO No Growth to date 01/04/2020    LABBLOO No Growth to date 01/04/2020    LABBLOO No Growth to date 01/04/2020    LABBLOO No Growth to date 01/04/2020    LABBLOO No Growth to date 01/04/2020    and Sputum Culture   Lab Results   Component Value Date    GSRESP <10 epithelial cells per low power field. 01/04/2020    GSRESP Rare WBC's 01/04/2020    GSRESP Many Gram positive cocci  01/04/2020    GSRESP Rare Gram negative rods 01/04/2020    RESPIRATORYC No S aureus or Pseudomonas isolated. 01/04/2020    RESPIRATORYC Normal respiratory ramses 01/04/2020       Pending Diagnostic Studies:     None         Imaging Results          CT Chest Without Contrast (Final result)  Result time 01/04/20 10:23:35    Final result by Dereck Granados MD (01/04/20 10:23:35)                 Impression:      Multifocal infectious bronchiolitis.  No effusions.    All CT scans at this facility are performed  using dose modulation techniques as appropriate to performed exam including the following:  automated exposure control; adjustment of mA and/or kV according to the patients size (this includes techniques or standardized protocols for targeted exams where dose is matched to indication/reason for exam: i.e. extremities or head);  iterative reconstruction technique.      Electronically signed by: Dereck Granados MD  Date:    01/04/2020  Time:    10:23             Narrative:    EXAMINATION:  CT CHEST WITHOUT CONTRAST    CLINICAL HISTORY:  Cough, persistent, xray nondiagnostic;    TECHNIQUE:  Axial CT images performed through the chest without intravenous contrast.    COMPARISON:  Chest radiograph today    FINDINGS:  Borderline heart size.  Prominent pericardial pad.  Aortic atherosclerosis.  Coronary artery calcifications.  No thoracic adenopathy.    Mild diffuse bronchial wall thickening, greatest in the lower lobes.  Tree-in-bud micro nodular opacities in the lingula and bilateral lower lobes characteristic of an infectious bronchiolitis.  No alveolar consolidations or effusions.  No pleural effusions.  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    The upper abdominal visualized organs are within normal limits.    The bones are intact.                               X-Ray Abdomen Flat And Erect (Final result)  Result time 01/04/20 08:46:20    Final result by Adonay Hines MD  (01/04/20 08:46:20)                 Impression:      Nonobstructive bowel gas pattern.      Electronically signed by: Adonay Hines MD  Date:    01/04/2020  Time:    08:46             Narrative:    EXAMINATION:  XR ABDOMEN FLAT AND ERECT    CLINICAL HISTORY:  Vomiting, unspecified    FINDINGS:  Lung bases are clear.  Normal nonobstructive bowel gas pattern with average stool.  No air-fluid level on upright imaging.  No free air.  Pelvic phleboliths.  Splenic and iliac atherosclerosis.  Degenerative spine.                               X-Ray Chest AP Portable (Final result)  Result time 01/04/20 08:45:28    Final result by Adonay Hines MD (01/04/20 08:45:28)                 Impression:      No acute process.      Electronically signed by: Adonay Hines MD  Date:    01/04/2020  Time:    08:45             Narrative:    EXAMINATION:  XR CHEST AP PORTABLE    CLINICAL HISTORY:  Chest Pain;    FINDINGS:  No prior study.  Normal size heart.  Aortic arch calcification.  No congestion.  Lungs are clear.  Postoperative right humerus.                               CT Head Without Contrast (Final result)  Result time 01/04/20 08:44:00    Final result by Adonay Hines MD (01/04/20 08:44:00)                 Impression:      No CT evidence of an acute intracranial process.  Age-appropriate atrophy with mild deep white matter microangiopathy.  Patchy ethmoid mucosal thickening.    All CT scans at this facility are performed  using dose modulation techniques as appropriate to performed exam including the following:  automated exposure control; adjustment of mA and/or kV according to the patients size (this includes techniques or standardized protocols for targeted exams where dose is matched to indication/reason for exam: i.e. extremities or head);  iterative reconstruction technique.      Electronically signed by: Adonay Hines MD  Date:    01/04/2020  Time:    08:44             Narrative:    EXAMINATION:  CT HEAD WITHOUT  CONTRAST    CLINICAL HISTORY:  Headache, acute, norm neuro exam;    TECHNIQUE:  Routine noncontrast head CT.    COMPARISON:  None    FINDINGS:  There is no acute intracranial hemorrhage or abnormal extra-axial fluid collection.  There is age-appropriate cerebral atrophy with ventricular-sulcal congruence.  Patchy periventricular and internal capsule deep white matter microangiopathy low attenuation.  There is no additional abnormal increased or decreased density within the brain parenchyma.  Gray-white differentiation preserved.  No intracranial mass or mass effect.  The calvarium is intact.  There is mucosal thickening bilateral ethmoid air cells.  Otherwise, the visualized paranasal sinuses and mastoids are well aerated.                                Medications:  Reconciled Home Medications:      Medication List      START taking these medications    albuterol 90 mcg/actuation inhaler  Commonly known as:  Ventolin HFA  Inhale 2 puffs into the lungs every 4 (four) hours as needed for Wheezing or Shortness of Breath. Rescue     guaiFENesin 600 mg 12 hr tablet  Commonly known as:  MUCINEX  Take 1 tablet (600 mg total) by mouth 2 (two) times daily. for 10 days     metoprolol succinate 25 MG 24 hr tablet  Commonly known as:  TOPROL-XL  Take 1 tablet (25 mg total) by mouth once daily.  Start taking on:  January 10, 2020     predniSONE 10 MG tablet  Commonly known as:  DELTASONE  Take 4 tabs (40mg total) by mouth daily for 4 days, then 3 tabs (30mg total) daily for 3 days, then 2 tabs (20mg total) daily for 2 days, then 1 tab (10mg total) daily for 1 day - Then Stop        CHANGE how you take these medications    levoFLOXacin 750 MG tablet  Commonly known as:  LEVAQUIN  Take 1 tablet (750 mg total) by mouth once daily. for 5 days  What changed:    · medication strength  · how much to take        CONTINUE taking these medications    ALPRAZolam 0.5 MG tablet  Commonly known as:  XANAX  Take 0.5 mg by mouth nightly as  needed.     apixaban 5 mg Tab  Commonly known as:  ELIQUIS  Take by mouth 2 (two) times daily.     ergocalciferol 50,000 unit Cap  Commonly known as:  ERGOCALCIFEROL  Take 50,000 Units by mouth every 7 days.     hydroCHLOROthiazide 25 MG tablet  Commonly known as:  HYDRODIURIL  Take 25 mg by mouth once daily.     HYDROcodone-acetaminophen 5-325 mg per tablet  Commonly known as:  NORCO  Take 1 tablet by mouth every 6 (six) hours as needed for Pain.     levothyroxine 50 MCG tablet  Commonly known as:  SYNTHROID  Take 50 mcg by mouth before breakfast.     Linzess 290 mcg Cap capsule  Generic drug:  linaCLOtide  as needed.     lisinopril 20 MG tablet  Commonly known as:  PRINIVIL,ZESTRIL  Take 20 mg by mouth once daily.     omeprazole 20 MG capsule  Commonly known as:  PRILOSEC  Take 20 mg by mouth once daily.            Indwelling Lines/Drains at time of discharge:   Lines/Drains/Airways     None                 Time spent on the discharge of patient: 45 minutes  Patient was seen and examined on the date of discharge and determined to be suitable for discharge.         Cherelle Sierra, DNP, ACNP-BC  Department of Hospital Medicine  [facility]

## 2020-01-09 NOTE — NURSING
IV removed, cath tip intact, patient tolerated well. Discharge paperwork reviewed, patient stated understanding. Patient waiting for ride.

## 2020-01-09 NOTE — PLAN OF CARE
Jan13 Go to Dr Dharmesh [place]   Monday Jan 13, 2020   at 1:15 pm  [address] [place], [place] [address] [phone]         01/09/20 1049   Final Note   Assessment Type Final Discharge Note   Anticipated Discharge Disposition Home-Health   Hospital Follow Up  Appt(s) scheduled? Yes   Right Care Referral Info   Post Acute Recommendation Home-care   Facility Name [facility]

## 2020-01-09 NOTE — NURSING
Dietary call to instruct pt on a renal diet pt taught verbalize understanding   meds call to [facility] pharmacy on [address]  Pt will have to follow up with primary regarding new onset afib will take the metropolol for now has follow up appt with primary and cards pt verablized understanding

## 2020-01-09 NOTE — PLAN OF CARE
Patient AAOX 4. VSS.  Patient remained afebrile throughout shift.  Patient remained free of falls this shift  Patient 0/10 of pain this shift  Blood glucose monitored, corrected via SSI.  Plan of care reviewed.  Patient verbalized understanding.  Patient moving/turning independently  Frequent weight shifting encouraged.  Patient A-fib on monitor  Bed low, side rails up x2, wheels locked, call light in reach.  Patient instructed to call for assistance.  Hourly rounding completed.  Will continue to monitor.

## 2020-01-09 NOTE — PROGRESS NOTES
Home Oxygen Evaluation    Date Performed: 2020    1) Patient's Home O2 Sat on room air, while at rest: 96%      If O2 sats on room air at rest are 88% or below, patient qualifies. No additional testing needed. Document N/A in steps 2 and 3. If 89% or above, complete steps 2.      2) Patient's O2 Sat on room air while exercisin%        If O2 sats on room air while exercising remain 89% or above patient does not qualify, no further testing needed Document N/A in step 3. If O2 sats on room air while exercising are 88% or below, continue to step 3.      3) Patient's O2 Sat while exercising on O2: NA       (Must show improvement from #2 for patients to qualify)    If O2 sats improve on oxygen, patient qualifies for portable oxygen. If not, the patient does not qualify.

## 2020-01-13 ENCOUNTER — PATIENT OUTREACH (OUTPATIENT)
Dept: ADMINISTRATIVE | Facility: CLINIC | Age: 85
End: 2020-01-13

## 2020-01-13 DIAGNOSIS — J21.9 ACUTE BRONCHIOLITIS, UNSPECIFIED: Primary | ICD-10-CM

## 2020-01-13 LAB
ENTEROVIRUS: NOT DETECTED
HUMAN BOCAVIRUS: NOT DETECTED
HUMAN CORONAVIRUS, COMMON COLD VIRUS: NOT DETECTED
INFLUENZA A - H1N1-09: NOT DETECTED
PARAINFLUENZA: NOT DETECTED
RVP - ADENOVIRUS: NOT DETECTED
RVP - HUMAN METAPNEUMOVIRUS (HMPV): NOT DETECTED
RVP - INFLUENZA A: NOT DETECTED
RVP - INFLUENZA B: NOT DETECTED
RVP - RESPIRATORY SYNCTIAL VIRUS (RSV) A: POSITIVE
RVP - RESPIRATORY VIRAL PANEL, SOURCE: ABNORMAL
RVP - RHINOVIRUS: NOT DETECTED

## 2020-01-13 RX ORDER — LISINOPRIL AND HYDROCHLOROTHIAZIDE 20; 25 MG/1; MG/1
1 TABLET ORAL DAILY
COMMUNITY

## 2021-02-23 NOTE — PLAN OF CARE
Care plan reviewed with patient. A little anxious about her  being discharged with nobody with him. Free from falls. Hopeful that she will get better. No other complaint made.   No

## 2022-06-01 ENCOUNTER — TELEPHONE (OUTPATIENT)
Dept: NEUROLOGY | Facility: CLINIC | Age: 87
End: 2022-06-01
Payer: COMMERCIAL

## 2022-06-01 NOTE — TELEPHONE ENCOUNTER
----- Message from Kathy Sweet sent at 6/1/2022 12:37 PM CDT -----  Regarding: Appt  Contact: [phone]  Patient is calling to schedule jamilah ppt sooner than June 30th. Please contact pt    Sec:[phone]

## 2022-06-15 NOTE — PROGRESS NOTES
"Subjective:       Patient ID: Aurea Harper is a 89 y.o. female.    Chief Complaint: Memory Loss (/), Reveiw Results, and Establish Care          HPI The patient is here for memory evaluation and review of MRI Brain Results.  The patient is unaccompanied. Patient reports that she is fully independent, lives alone. She reports, she has no serious concerns regarding her memory but states that her ENT specialist recommend she be evaluated by neurology to assess patient memory changes and evaluate recent MRI Brain results. Patient reports she occasionally forget things to do or planned, if its not written down. She reports she have to write things down but she has done this most of her adult life. The patient occasionally forgets where placed certain things but able to recover items. She reports she is very organized, and everything has its place. She states "I am compulsive about doing so". The patient denies forgetting names. The patient drives locally but she she uses an Uber  if she don't feel well or if she needs to go somewhere far.  She used a Uber  for today's appointment.  The patient denies losing personal items and does not put them in odd places. No confusion around and inside the house. No trouble remembering the date and time, keeping up with medications and appointments and keeping up with major holidays and political changes. The patient is independent in handling finances. The patient is still independent with ADLs. No hallucinations or delusions. No seizures. No behavioral problems. No language problems. No problems handling tools. No history of strokes. No history of headaches. Chronic hypothyroidism,  No history of alcoholism. No history of B12 deficiency. No history of depression. No history of STDs.  No history of HIV infection. No toxic exposures.  No history of traumatic brain injury. Patient takes Xanax 0.5 mg po HS to help with sleep. Patient has taken Xanax for the last 20 years. "  No falls, she always ambulate with LBP uses a cane. Chronic lumbar DDD. No urinary incontinence. Good appetite. No family history of dementia. 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 No acute hemorraghic, no stroke, no significant change noted from prior study. MOCA 26/30 Mild to Normal results.         Review of Systems   Constitutional: Negative for activity change, appetite change, chills, diaphoresis, fatigue, fever and unexpected weight change.   HENT: Negative for congestion, dental problem, drooling, ear discharge, ear pain, facial swelling, hearing loss, mouth sores, nosebleeds, postnasal drip, rhinorrhea, sinus pressure, sinus pain, sneezing, sore throat, tinnitus, trouble swallowing and voice change.    Eyes: Negative for photophobia, pain, discharge, redness, itching and visual disturbance.   Respiratory: Negative for apnea, cough, choking, chest tightness, shortness of breath, wheezing and stridor.    Cardiovascular: Negative for chest pain, palpitations and leg swelling.   Gastrointestinal: Negative for abdominal distention, abdominal pain, anal bleeding, blood in stool, constipation, diarrhea, nausea, rectal pain and vomiting.   Endocrine: Negative for cold intolerance, heat intolerance, polydipsia, polyphagia and polyuria.   Genitourinary: Negative for decreased urine volume, difficulty urinating, dysuria, enuresis, flank pain, frequency, genital sores, hematuria and urgency.   Musculoskeletal: Positive for arthralgias, back pain and gait problem. Negative for joint swelling, myalgias, neck pain and neck stiffness.   Skin: Negative for color change, pallor, rash and wound.   Allergic/Immunologic: Negative for environmental allergies, food allergies and immunocompromised state.   Neurological: Negative for dizziness, tremors, seizures, syncope, facial asymmetry, speech difficulty,  weakness, light-headedness, numbness and headaches.   Hematological: Negative for adenopathy. Does not bruise/bleed easily.   Psychiatric/Behavioral: Positive for decreased concentration. Negative for agitation, behavioral problems, confusion, dysphoric mood, hallucinations, self-injury, sleep disturbance and suicidal ideas. The patient is not nervous/anxious and is not hyperactive.                  Current Outpatient Medications:     albuterol (VENTOLIN HFA) 90 mcg/actuation inhaler, Inhale 2 puffs into the lungs every 4 (four) hours as needed for Wheezing or Shortness of Breath. Rescue, Disp: 18 g, Rfl: 0    alprazolam (XANAX) 0.5 MG tablet, Take 0.5 mg by mouth nightly as needed. , Disp: , Rfl:     apixaban (ELIQUIS) 5 mg Tab, Take by mouth 2 (two) times daily. , Disp: , Rfl:     ergocalciferol (ERGOCALCIFEROL) 50,000 unit Cap, Take 50,000 Units by mouth every 7 days., Disp: , Rfl:     hydrochlorothiazide (HYDRODIURIL) 25 MG tablet, Take 25 mg by mouth once daily., Disp: , Rfl:     hydrocodone-acetaminophen 5-325mg (NORCO) 5-325 mg per tablet, Take 1 tablet by mouth every 6 (six) hours as needed for Pain., Disp: 15 tablet, Rfl: 0    levothyroxine (SYNTHROID) 50 MCG tablet, Take 50 mcg by mouth before breakfast. , Disp: , Rfl:     linaCLOtide (LINZESS) 290 mcg Cap capsule, as needed. , Disp: , Rfl:     lisinopril (PRINIVIL,ZESTRIL) 20 MG tablet, Take 20 mg by mouth once daily., Disp: , Rfl:     lisinopril-hydrochlorothiazide (PRINZIDE,ZESTORETIC) 20-25 mg Tab, Take 1 tablet by mouth once daily., Disp: , Rfl:     omeprazole (PRILOSEC) 20 MG capsule, Take 20 mg by mouth once daily., Disp: , Rfl:     predniSONE (DELTASONE) 10 MG tablet, Take 4 tabs (40mg total) by mouth daily for 4 days, then 3 tabs (30mg total) daily for 3 days, then 2 tabs (20mg total) daily for 2 days, then 1 tab (10mg total) daily for 1 day - Then Stop, Disp: 30 tablet, Rfl: 0    metoprolol succinate (TOPROL-XL) 25 MG 24 hr tablet,  Take 1 tablet (25 mg total) by mouth once daily., Disp: 30 tablet, Rfl: 0  Past Medical History:   Diagnosis Date    Abdominal pain 1/4/2020    Hypertension     Irregular heart rhythm     Mitral valve prolapse     Pulmonary embolus      Past Surgical History:   Procedure Laterality Date    BACK SURGERY      EYE SURGERY      HYSTERECTOMY      TOTAL KNEE ARTHROPLASTY       Social History     Socioeconomic History    Marital status:    Tobacco Use    Smoking status: Never Smoker    Smokeless tobacco: Never Used   Substance and Sexual Activity    Alcohol use: No    Drug use: No    Sexual activity: Not Currently   Social History Narrative    Her SDM is her  Enrike who can be reached at [phone].             Past/Current Medical/Surgical History, Past/Current Social History, Past/Current Family History and Past/Current Medications were reviewed in detail.        Objective:           VITAL SIGNS WERE REVIEWED      GENERAL APPEARANCE:     The patient looks comfortable, very pleasant, cooperative    BMI 31.59 KG    No signs of respiratory distress.    Normal breathing pattern.    No dysmorphic features    Normal eye contact.     GENERAL MEDICAL EXAM:    HEENT:  Head is atraumatic normocephalic.     No tender temporal arteries.     Neck and Axillae: No JVD. No visible lesions.    No carotid bruits. No thyromegaly. No lymphadenopathy.    Cardiopulmonary: No cyanosis. No tachypnea. Normal respiratory effort.    Gastrointestinal/Urogenital:  No jaundice. No stomas or lesions. No visible hernias. No catheters.     Abdomen is soft non-tender. No masses or organomegaly.    Skin, Hair and Nails: No pathognonomic skin rash. No neurofibromatosis. No visible lesions.No stigmata of autoimmune disease. No clubbing.    Skin is warm and moist. No palpable masses.    Limbs: No varicose veins. No visible swelling.Wide base gait ambulate with cane    No palpable edema. Pulses are symmetric. Pedal pulses are  palpable.      Muskoskeletal: No visible deformities.No visible lesions.    No spine tenderness. No signs of longstanding neuropathy. No dislocations or fractures.            Neurologic Exam     Mental Status   Oriented to person, place, and time.   Registration: recalls 3 of 3 objects. Recall of objects at 5 minutes: 3/5. Follows 3 step commands.   Attention: normal. Concentration: decreased.   Speech: speech is normal   Level of consciousness: alert  Knowledge: consistent with education and good. Able to perform simple calculations.   Able to name object. Able to read. Able to repeat. Able to write. Normal comprehension.       MOCA 26/30    Visuospatial/Executive 3/5    Naming                            3/3    Attention                         6/6    Language                         3/3    Abstraction                    2/2    Recall                                3/5    Orientation                     6/6        NORMAL-MILD NCD 26-30    2 years of College      Cranial Nerves     CN II   Visual fields full to confrontation.   Visual acuity: normal with correction  Right visual field deficit: none  Left visual field deficit: none     CN III, IV, VI   Pupils are equal, round, and reactive to light.  Extraocular motions are normal.   Right pupil: Size: 2 mm. Shape: regular. Reactivity: brisk. Consensual response: intact.   Left pupil: Size: 2 mm. Shape: regular. Reactivity: brisk. Consensual response: intact.   Nystagmus: none   Diplopia: none  Ophthalmoparesis: none  Upgaze: normal  Downgaze: normal  Conjugate gaze: present  Vestibulo-ocular reflex: present    CN V   Facial sensation intact.   Right facial sensation deficit: none  Left facial sensation deficit: none    CN VII   Facial expression full, symmetric.     CN VIII   CN VIII normal.   Hearing: impaired    CN IX, X   CN IX normal.     CN XI   CN XI normal.     CN XII   Tongue: not atrophic  Fasciculations: absent  Tongue deviation: none    Motor Exam    Muscle bulk: normal  Overall muscle tone: normal  Right arm tone: normal  Left arm tone: normal  Right arm pronator drift: absent  Left arm pronator drift: absent  Right leg tone: normal  Left leg tone: normal    Strength   Strength 5/5 throughout.   Right neck flexion: 5/5  Left neck flexion: 5/5  Right neck extension: 5/5  Left neck extension: 5/5  Right deltoid: 5/5  Left deltoid: 5/5  Right biceps: 5/5  Left biceps: 5/5  Right triceps: 5/5  Left triceps: 5/5  Right wrist flexion: 5/5  Left wrist flexion: 5/5  Right wrist extension: 5/5  Left wrist extension: 5/5  Right interossei: 5/5  Left interossei: 5/5  Right iliopsoas: 5/5  Left iliopsoas: 5/5  Right quadriceps: 5/5  Left quadriceps: 5/5  Right hamstrin/5  Left hamstrin/5  Right glutei: 5/5  Left glutei: 5/5  Right anterior tibial: 5/5  Left anterior tibial: 5/5  Right posterior tibial: 5/5  Left posterior tibial: 5/5  Right peroneal: 5/5  Left peroneal: 5/5  Right gastroc: 5/5  Left gastroc: 5/5    Sensory Exam   Light touch normal.   Right arm light touch: normal  Left arm light touch: normal  Right leg light touch: normal  Left leg light touch: normal  Vibration normal.   Right arm vibration: normal  Left arm vibration: normal  Right leg vibration: normal  Left leg vibration: normal  Proprioception normal.   Right arm proprioception: normal  Left arm proprioception: normal  Right leg proprioception: normal  Left leg proprioception: normal  Pinprick normal.   Right arm pinprick: normal  Left arm pinprick: normal  Right leg pinprick: normal  Left leg pinprick: normal  Graphesthesia: normal  Stereognosis: normal    Gait, Coordination, and Reflexes     Gait  Gait: wide-based (ambulates with cane )    Coordination   Romberg: negative    Tremor   Resting tremor: absent  Intention tremor: absent  Action tremor: absent    Reflexes   Right brachioradialis: 2+  Left brachioradialis: 2+  Right biceps: 2+  Left biceps: 2+  Right triceps: 2+  Left triceps:  2+  Right patellar: 1+  Left patellar: 1+  Right plantar: normal  Left plantar: normal  Right Arnett: absent  Left Arnett: absent  Right ankle clonus: absent  Left ankle clonus: absent  Right pendular knee jerk: absent  Left pendular knee jerk: absent      Lab Results   Component Value Date    WBC 13.23 (H) 01/09/2020    HGB 14.8 01/09/2020    HCT 45.7 01/09/2020    MCV 88 01/09/2020     01/09/2020     Sodium   Date Value Ref Range Status   01/09/2020 136 136 - 145 mmol/L Final     Potassium   Date Value Ref Range Status   01/09/2020 3.4 (L) 3.5 - 5.1 mmol/L Final     Chloride   Date Value Ref Range Status   01/09/2020 89 (L) 95 - 110 mmol/L Final     CO2   Date Value Ref Range Status   01/09/2020 32 (H) 23 - 29 mmol/L Final     Glucose   Date Value Ref Range Status   01/09/2020 180 (H) 70 - 110 mg/dL Final     BUN   Date Value Ref Range Status   01/09/2020 28 (H) 8 - 23 mg/dL Final     Creatinine   Date Value Ref Range Status   01/09/2020 0.8 0.5 - 1.4 mg/dL Final     Calcium   Date Value Ref Range Status   01/09/2020 9.6 8.7 - 10.5 mg/dL Final     Total Protein   Date Value Ref Range Status   01/04/2020 7.2 6.0 - 8.4 g/dL Final     Albumin   Date Value Ref Range Status   01/04/2020 3.6 3.5 - 5.2 g/dL Final     Total Bilirubin   Date Value Ref Range Status   01/04/2020 0.7 0.1 - 1.0 mg/dL Final     Comment:     For infants and newborns, interpretation of results should be based  on gestational age, weight and in agreement with clinical  observations.  Premature Infant recommended reference ranges:  Up to 24 hours.............<8.0 mg/dL  Up to 48 hours............<12.0 mg/dL  3-5 days..................<15.0 mg/dL  6-29 days.................<15.0 mg/dL       Alkaline Phosphatase   Date Value Ref Range Status   01/04/2020 106 55 - 135 U/L Final     AST   Date Value Ref Range Status   01/04/2020 24 10 - 40 U/L Final     ALT   Date Value Ref Range Status   01/04/2020 12 10 - 44 U/L Final     Anion Gap   Date  Value Ref Range Status   01/09/2020 15 8 - 16 mmol/L Final     eGFR if    Date Value Ref Range Status   01/09/2020 >60 >60 mL/min/1.73 m^2 Final     eGFR if non    Date Value Ref Range Status   01/09/2020 >60 >60 mL/min/1.73 m^2 Final     Comment:     Calculation used to obtain the estimated glomerular filtration  rate (eGFR) is the CKD-EPI equation.        No results found for: [id number]  Lab Results   Component Value Date    TSH 0.742 01/05/2020     MRI Brain (Uploaded in Media)     02-     Extensive whit matter signal alteration is evident compatible with microangiopathic disease.   No acute hemorraghic, no stroke, no significant change noted from prior study      Reviewed the neuroimaging independently       Assessment:       1. Cognitive complaints with normal exam    2. 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 Other amnesia    4. Hypothyroidism, unspecified type    5. Sensory hearing loss, bilateral        Plan:         COGNITIVE COMPLAINTS WITH NORMAL EXAM/ FORGETFUL/ CHRONIC USE OF BENZODIAZEPINE FOR SLEEP/ CHRONIC HYPOTHYROIDISM/ SENSORY HEARING LOSS, BILATERAL SNHL B/L,        EVALUATION     Reviewed Brain MRI    T4, TSH, FA, HC, B12, MMA, RPR to evaluate for treatable causes of memory loss.    Comprehensive Neuropsychological Testing     NO DMA recommended at this time    HOME CARE     Falling Down Precautions. Gait is affected by the disease as well.     Avoid driving and access to firearms     Help with finances and decision making.    Join support group.    Proofing the house and use labeling.    Avoid antihistamines and anticholinergics.    Avoid changing routine.    Use written reminders.    Avoid multitasking.    Healthy diet, exercise (physical and cognitive).    Good sleep hygiene.    PREVENTION OF DELIRIUM       1. Good hydration and avoid electrolyte imbalance  2. Recognize andtreat infections immediately especially UTI.  3. Bladder  emptying and prevent constipation.   4. Provide stimulating activities and familiar objects  5. Use eyeglasses and hearing aids if needed.   6. Use simple and regular communication about people, current place and time  7. Mobility and range-of-motion exercises  8. Reduce noise, lighting and avoid sleep interruptions  9. Non-narcotic pain management.  10.Nondrug treatment for sleep problems or anxiety  11. Avoid antihistamines.  12. Avoid narcotics.  13. Avoid benzodiazepines.     MEDICAL/SURGICAL COMORBIDITIES     All relevant medical comorbidities noted and managed by primary care physician and medical care team.          MISCELLANEOUS MEDICAL PROBLEMS       HEALTHY LIFESTYLE AND PREVENTATIVE CARE    Encouraged the patient to adhere to the age-appropriate health maintenance guidelines including screening tests and vaccinations.     Discussed the overall importance of healthy lifestyle, optimal weight, exercise, healthy diet, good sleep hygiene and avoiding drugs including smoking, alcohol and recreational drugs. The patient verbalized full understanding.       Advised the patient to follow COVID-19 prevention measures.       I spent 120 TOTAL E/M minutes, more than 50% spent  face to face with the patient    Time spent in counseling and coordination of care including discussions etiology of diagnosis, pathogenesis of diagnosis, prognosis of diagnosis,, diagnostic results, impression and recommendations, diagnostic studies, management, risks and benefits of treatment, instructions of disease self-management, treatment instructions, follow up requirements, patient and family counseling/involvement in care compliance with treatment regimen. All of the patient's questions were answered during this discussion.       RTC MARY Bennett, MSN NP      Collaborating Provider: Gabo Novak MD, FAAN Neurologist/Epileptologist

## 2022-06-16 ENCOUNTER — TELEPHONE (OUTPATIENT)
Dept: NEUROLOGY | Facility: CLINIC | Age: 87
End: 2022-06-16
Payer: COMMERCIAL

## 2022-06-16 ENCOUNTER — OFFICE VISIT (OUTPATIENT)
Dept: NEUROLOGY | Facility: CLINIC | Age: 87
End: 2022-06-16
Payer: MEDICARE

## 2022-06-16 VITALS
OXYGEN SATURATION: 96 % | RESPIRATION RATE: 16 BRPM | BODY MASS INDEX: 31.66 KG/M2 | HEIGHT: 67 IN | WEIGHT: 201.75 LBS | SYSTOLIC BLOOD PRESSURE: 142 MMHG | DIASTOLIC BLOOD PRESSURE: 81 MMHG | HEART RATE: 60 BPM

## 2022-06-16 DIAGNOSIS — R41.3 OTHER AMNESIA: ICD-10-CM

## 2022-06-16 DIAGNOSIS — Z79.899 CHRONIC USE OF BENZODIAZEPINE FOR THERAPEUTIC PURPOSE: ICD-10-CM

## 2022-06-16 DIAGNOSIS — E03.9 HYPOTHYROIDISM, UNSPECIFIED TYPE: ICD-10-CM

## 2022-06-16 DIAGNOSIS — R41.9 COGNITIVE COMPLAINTS WITH NORMAL EXAM: Primary | ICD-10-CM

## 2022-06-16 DIAGNOSIS — H90.3 SENSORY HEARING LOSS, BILATERAL: ICD-10-CM

## 2022-06-16 PROCEDURE — 99205 OFFICE O/P NEW HI 60 MIN: CPT | Mod: S$GLB,,, | Performed by: NURSE PRACTITIONER

## 2022-06-16 PROCEDURE — 1160F RVW MEDS BY RX/DR IN RCRD: CPT | Mod: CPTII,S$GLB,, | Performed by: NURSE PRACTITIONER

## 2022-06-16 PROCEDURE — 1101F PR PT FALLS ASSESS DOC 0-1 FALLS W/OUT INJ PAST YR: ICD-10-PCS | Mod: CPTII,S$GLB,, | Performed by: NURSE PRACTITIONER

## 2022-06-16 PROCEDURE — 1159F PR MEDICATION LIST DOCUMENTED IN MEDICAL RECORD: ICD-10-PCS | Mod: CPTII,S$GLB,, | Performed by: NURSE PRACTITIONER

## 2022-06-16 PROCEDURE — 1101F PT FALLS ASSESS-DOCD LE1/YR: CPT | Mod: CPTII,S$GLB,, | Performed by: NURSE PRACTITIONER

## 2022-06-16 PROCEDURE — 99417 PR PROLONGED SVC, OUTPT, W/WO DIRECT PT CONTACT,  EA ADDTL 15 MIN: ICD-10-PCS | Mod: S$GLB,,, | Performed by: NURSE PRACTITIONER

## 2022-06-16 PROCEDURE — 3288F PR FALLS RISK ASSESSMENT DOCUMENTED: ICD-10-PCS | Mod: CPTII,S$GLB,, | Performed by: NURSE PRACTITIONER

## 2022-06-16 PROCEDURE — 1125F AMNT PAIN NOTED PAIN PRSNT: CPT | Mod: CPTII,S$GLB,, | Performed by: NURSE PRACTITIONER

## 2022-06-16 PROCEDURE — 99999 PR PBB SHADOW E&M-EST. PATIENT-LVL V: ICD-10-PCS | Mod: PBBFAC,,, | Performed by: NURSE PRACTITIONER

## 2022-06-16 PROCEDURE — 99205 PR OFFICE/OUTPT VISIT, NEW, LEVL V, 60-74 MIN: ICD-10-PCS | Mod: S$GLB,,, | Performed by: NURSE PRACTITIONER

## 2022-06-16 PROCEDURE — 1125F PR PAIN SEVERITY QUANTIFIED, PAIN PRESENT: ICD-10-PCS | Mod: CPTII,S$GLB,, | Performed by: NURSE PRACTITIONER

## 2022-06-16 PROCEDURE — 1160F PR REVIEW ALL MEDS BY PRESCRIBER/CLIN PHARMACIST DOCUMENTED: ICD-10-PCS | Mod: CPTII,S$GLB,, | Performed by: NURSE PRACTITIONER

## 2022-06-16 PROCEDURE — 3288F FALL RISK ASSESSMENT DOCD: CPT | Mod: CPTII,S$GLB,, | Performed by: NURSE PRACTITIONER

## 2022-06-16 PROCEDURE — 99417 PROLNG OP E/M EACH 15 MIN: CPT | Mod: S$GLB,,, | Performed by: NURSE PRACTITIONER

## 2022-06-16 PROCEDURE — 1159F MED LIST DOCD IN RCRD: CPT | Mod: CPTII,S$GLB,, | Performed by: NURSE PRACTITIONER

## 2022-06-16 PROCEDURE — 99999 PR PBB SHADOW E&M-EST. PATIENT-LVL V: CPT | Mod: PBBFAC,,, | Performed by: NURSE PRACTITIONER

## 2022-06-17 ENCOUNTER — TELEPHONE (OUTPATIENT)
Dept: NEUROLOGY | Facility: CLINIC | Age: 87
End: 2022-06-17
Payer: COMMERCIAL

## 2022-06-17 NOTE — TELEPHONE ENCOUNTER
Attempted to contact patient to let her know that I had gotten a copy of her MRI and was putting her disk in the mail. ANDREW left-BR

## 2022-06-20 ENCOUNTER — TELEPHONE (OUTPATIENT)
Dept: NEUROLOGY | Facility: CLINIC | Age: 87
End: 2022-06-20
Payer: COMMERCIAL

## 2022-06-20 NOTE — TELEPHONE ENCOUNTER
----- Message from Paris Rousseau sent at 6/20/2022  9:17 AM CDT -----  Contact: Aurea Villar would like a call back at [phone], Regards to her lab orders needs to be sent to Dr. Dharmesh [place] office so that the labs can be done in his office and also she stated that the office needs to get a copy of the last brain scan that was done on 2/11/22 at the [facility].     Thanks  Td

## 2022-06-20 NOTE — TELEPHONE ENCOUNTER
Patient has been advise that paper work has been faxed over to her doctor office. Located at [facility].

## 2024-02-08 NOTE — ASSESSMENT & PLAN NOTE
- BP under fair control  - Continue home meds  - monitor and adjust as needed     GOALS:   Continued/Maintain healthy weight loss with good nutrition intakes.  Adequate hydration with at least 64oz. fluid intake.  Normal vitamin and mineral levels.  Exercise as tolerated.  Bariatric Pal MVI w/ 45mg iron - $99/year deal     Follow-up in 1 year. We kindly ask that your arrive 15 minutes before your scheduled appointment time with your provider to allow our staff to room you, get your vital signs and update your chart.    Follow diet as discussed.      Get lab work done in the next 2 weeks.  You have been given a lab slip today.  Please call the office if you need a replacement.  It is recommended to check with your insurance BEFORE getting labs done to make sure they are covered by your policy.  Also, please check with your PCP and other providers before getting labs to avoid duplicate labs. Make sure to HOLD any multivitamins that may contain biotin and any biotin supplements FOR 5 DAYS before any labs since it can affect the results.    Follow vitamin and mineral recommendations as reviewed with you.    Call our office if you have any problems with abdominal pain especially associated with fever, chills, nausea, vomiting or any other concerns.    All  Post-bariatric surgery patients should be aware that very small quantities of any alcohol can cause impairment and it is very possible not to feel the effect. The effect can be in the system for several hours.  It is also a stomach irritant.     It is advised to AVOID alcohol, Nonsteroidal antiinflammatory drugs (NSAIDS) and nicotine of all forms . Any of these can cause stomach irritation/pain.